# Patient Record
Sex: MALE | Race: WHITE | NOT HISPANIC OR LATINO | Employment: OTHER | URBAN - METROPOLITAN AREA
[De-identification: names, ages, dates, MRNs, and addresses within clinical notes are randomized per-mention and may not be internally consistent; named-entity substitution may affect disease eponyms.]

---

## 2021-05-30 ENCOUNTER — RECORDS - HEALTHEAST (OUTPATIENT)
Dept: ADMINISTRATIVE | Facility: CLINIC | Age: 76
End: 2021-05-30

## 2022-05-28 ENCOUNTER — HOSPITAL ENCOUNTER (EMERGENCY)
Facility: HOSPITAL | Age: 77
Discharge: LEFT BEFORE DISCHARGE | End: 2022-05-28
Attending: EMERGENCY MEDICINE | Admitting: EMERGENCY MEDICINE

## 2022-05-28 ENCOUNTER — APPOINTMENT (OUTPATIENT)
Dept: CT IMAGING | Facility: HOSPITAL | Age: 77
End: 2022-05-28
Attending: EMERGENCY MEDICINE

## 2022-05-28 VITALS
RESPIRATION RATE: 25 BRPM | WEIGHT: 180 LBS | HEART RATE: 79 BPM | SYSTOLIC BLOOD PRESSURE: 152 MMHG | TEMPERATURE: 99.6 F | DIASTOLIC BLOOD PRESSURE: 81 MMHG | OXYGEN SATURATION: 97 %

## 2022-05-28 DIAGNOSIS — R55 SYNCOPE, UNSPECIFIED SYNCOPE TYPE: ICD-10-CM

## 2022-05-28 LAB
ALBUMIN SERPL-MCNC: 3.3 G/DL (ref 3.5–5)
ALBUMIN UR-MCNC: 30 MG/DL
ALP SERPL-CCNC: 59 U/L (ref 45–120)
ALT SERPL W P-5'-P-CCNC: <9 U/L (ref 0–45)
ANION GAP SERPL CALCULATED.3IONS-SCNC: 12 MMOL/L (ref 5–18)
APPEARANCE UR: CLEAR
APTT PPP: 30 SECONDS (ref 22–38)
AST SERPL W P-5'-P-CCNC: 14 U/L (ref 0–40)
BASOPHILS # BLD AUTO: 0.1 10E3/UL (ref 0–0.2)
BASOPHILS NFR BLD AUTO: 1 %
BILIRUB DIRECT SERPL-MCNC: 0.2 MG/DL
BILIRUB SERPL-MCNC: 0.4 MG/DL (ref 0–1)
BILIRUB UR QL STRIP: NEGATIVE
BNP SERPL-MCNC: 60 PG/ML (ref 0–80)
BUN SERPL-MCNC: 19 MG/DL (ref 8–28)
CALCIUM SERPL-MCNC: 8.4 MG/DL (ref 8.5–10.5)
CHLORIDE BLD-SCNC: 103 MMOL/L (ref 98–107)
CO2 SERPL-SCNC: 18 MMOL/L (ref 22–31)
COLOR UR AUTO: ABNORMAL
CREAT SERPL-MCNC: 1.29 MG/DL (ref 0.7–1.3)
D DIMER PPP FEU-MCNC: 0.85 UG/ML FEU (ref 0–0.5)
EOSINOPHIL # BLD AUTO: 0 10E3/UL (ref 0–0.7)
EOSINOPHIL NFR BLD AUTO: 0 %
ERYTHROCYTE [DISTWIDTH] IN BLOOD BY AUTOMATED COUNT: 13.6 % (ref 10–15)
GFR SERPL CREATININE-BSD FRML MDRD: 57 ML/MIN/1.73M2
GLUCOSE BLD-MCNC: 92 MG/DL (ref 70–125)
GLUCOSE UR STRIP-MCNC: NEGATIVE MG/DL
HCT VFR BLD AUTO: 39 % (ref 40–53)
HGB BLD-MCNC: 13 G/DL (ref 13.3–17.7)
HGB UR QL STRIP: ABNORMAL
IMM GRANULOCYTES # BLD: 0 10E3/UL
IMM GRANULOCYTES NFR BLD: 1 %
INR PPP: 1.09 (ref 0.85–1.15)
KETONES UR STRIP-MCNC: NEGATIVE MG/DL
LACTATE SERPL-SCNC: 1.4 MMOL/L (ref 0.7–2)
LEUKOCYTE ESTERASE UR QL STRIP: NEGATIVE
LYMPHOCYTES # BLD AUTO: 0.5 10E3/UL (ref 0.8–5.3)
LYMPHOCYTES NFR BLD AUTO: 9 %
MCH RBC QN AUTO: 30.2 PG (ref 26.5–33)
MCHC RBC AUTO-ENTMCNC: 33.3 G/DL (ref 31.5–36.5)
MCV RBC AUTO: 91 FL (ref 78–100)
MONOCYTES # BLD AUTO: 0.8 10E3/UL (ref 0–1.3)
MONOCYTES NFR BLD AUTO: 14 %
NEUTROPHILS # BLD AUTO: 4.3 10E3/UL (ref 1.6–8.3)
NEUTROPHILS NFR BLD AUTO: 75 %
NITRATE UR QL: NEGATIVE
NRBC # BLD AUTO: 0 10E3/UL
NRBC BLD AUTO-RTO: 0 /100
PH UR STRIP: 5.5 [PH] (ref 5–7)
PLATELET # BLD AUTO: 188 10E3/UL (ref 150–450)
POTASSIUM BLD-SCNC: 4 MMOL/L (ref 3.5–5)
PROT SERPL-MCNC: 6.3 G/DL (ref 6–8)
RBC # BLD AUTO: 4.31 10E6/UL (ref 4.4–5.9)
RBC URINE: 5 /HPF
SODIUM SERPL-SCNC: 133 MMOL/L (ref 136–145)
SP GR UR STRIP: 1.02 (ref 1–1.03)
SQUAMOUS EPITHELIAL: 1 /HPF
TROPONIN I SERPL-MCNC: 0.01 NG/ML (ref 0–0.29)
TROPONIN I SERPL-MCNC: 0.01 NG/ML (ref 0–0.29)
UROBILINOGEN UR STRIP-MCNC: <2 MG/DL
WBC # BLD AUTO: 5.7 10E3/UL (ref 4–11)
WBC URINE: 1 /HPF

## 2022-05-28 PROCEDURE — 250N000011 HC RX IP 250 OP 636: Performed by: EMERGENCY MEDICINE

## 2022-05-28 PROCEDURE — 70450 CT HEAD/BRAIN W/O DYE: CPT

## 2022-05-28 PROCEDURE — 82248 BILIRUBIN DIRECT: CPT | Performed by: EMERGENCY MEDICINE

## 2022-05-28 PROCEDURE — 93005 ELECTROCARDIOGRAM TRACING: CPT | Performed by: EMERGENCY MEDICINE

## 2022-05-28 PROCEDURE — 84484 ASSAY OF TROPONIN QUANT: CPT | Performed by: EMERGENCY MEDICINE

## 2022-05-28 PROCEDURE — 99285 EMERGENCY DEPT VISIT HI MDM: CPT | Mod: 25

## 2022-05-28 PROCEDURE — 36415 COLL VENOUS BLD VENIPUNCTURE: CPT | Performed by: EMERGENCY MEDICINE

## 2022-05-28 PROCEDURE — 81001 URINALYSIS AUTO W/SCOPE: CPT | Performed by: EMERGENCY MEDICINE

## 2022-05-28 PROCEDURE — 83880 ASSAY OF NATRIURETIC PEPTIDE: CPT | Performed by: EMERGENCY MEDICINE

## 2022-05-28 PROCEDURE — 80053 COMPREHEN METABOLIC PANEL: CPT | Performed by: EMERGENCY MEDICINE

## 2022-05-28 PROCEDURE — 72125 CT NECK SPINE W/O DYE: CPT

## 2022-05-28 PROCEDURE — 83605 ASSAY OF LACTIC ACID: CPT | Performed by: EMERGENCY MEDICINE

## 2022-05-28 PROCEDURE — 85025 COMPLETE CBC W/AUTO DIFF WBC: CPT | Performed by: EMERGENCY MEDICINE

## 2022-05-28 PROCEDURE — 85610 PROTHROMBIN TIME: CPT | Performed by: EMERGENCY MEDICINE

## 2022-05-28 PROCEDURE — 85730 THROMBOPLASTIN TIME PARTIAL: CPT | Performed by: EMERGENCY MEDICINE

## 2022-05-28 PROCEDURE — 84484 ASSAY OF TROPONIN QUANT: CPT | Mod: 91 | Performed by: EMERGENCY MEDICINE

## 2022-05-28 PROCEDURE — 85379 FIBRIN DEGRADATION QUANT: CPT | Performed by: EMERGENCY MEDICINE

## 2022-05-28 PROCEDURE — 71275 CT ANGIOGRAPHY CHEST: CPT

## 2022-05-28 RX ORDER — GABAPENTIN 100 MG/1
CAPSULE ORAL SEE ADMIN INSTRUCTIONS
COMMUNITY

## 2022-05-28 RX ORDER — IOPAMIDOL 755 MG/ML
75 INJECTION, SOLUTION INTRAVASCULAR ONCE
Status: COMPLETED | OUTPATIENT
Start: 2022-05-28 | End: 2022-05-28

## 2022-05-28 RX ADMIN — IOPAMIDOL 75 ML: 755 INJECTION, SOLUTION INTRAVENOUS at 17:15

## 2022-05-28 NOTE — DISCHARGE INSTRUCTIONS
Please follow-up with your Primary Care Provider on Tuesday; call to arrange appointment.    Return to the ER immediately for recurrent syncope (if you pass out again or feel that you might), if you develop chest pain, shortness of breath, nausea / vomiting, fever or other concerns.    As discussed, the risks of discharge include but are not limited to death, sudden cardiac arrest, stroke, recurrent syncope with associated injury and persistent vegetative state.

## 2022-05-28 NOTE — ED TRIAGE NOTES
Per EMS, Found on ground outside of grocery store. Had two more syncopal events before they arrived. Patient denies memory of these. Ems reports very weak with standing and walking. Some low systolic bp in the 80s. Patient denies chest pain or dyspnea.      Triage Assessment     Row Name 05/28/22 1542       Triage Assessment (Adult)    Airway WDL WDL       Respiratory WDL    Respiratory WDL WDL       Skin Circulation/Temperature WDL    Skin Circulation/Temperature WDL WDL       Cardiac WDL    Cardiac WDL all       Peripheral/Neurovascular WDL    Peripheral Neurovascular WDL WDL       Cognitive/Neuro/Behavioral WDL    Cognitive/Neuro/Behavioral WDL WDL

## 2022-05-28 NOTE — ED PROVIDER NOTES
Emergency Department Encounter     Evaluation Date & Time:   5/28/2022  3:18 PM    CHIEF COMPLAINT:  Syncope      Triage Note:       Impression and Plan       FINAL IMPRESSION:    ICD-10-CM    1. Syncope, unspecified syncope type  R55          ED COURSE & MEDICAL DECISION MAKING:  3:17PM:I met with the patient to gather history and to perform my initial exam. We discussed plans for the ED course, including diagnostic testing and treatment.     76 year old male, history of CAD s/p 6-vessel CABG, DM2 with neuropathy, HLD, ADRIAN and depression, who presents via EMS for evaluation after he was found down outside of his house. Patient reports that he was just returning home from the grocery store when he felt fatigued and could not keep moving. He denies LOC and lightheadedness. Denies that he hit his head or has HA. Takes aspirin daily; no other anticoagulants. He also denies chest pain, SOB. No recent illness.     EMS report that patient was found on the ground outside his house and patient has no recollection of how he got there. He then proceeded to have 2 additional syncopal episodes after EMS arrival. His initial BP was in the 120s, but he had one episode of hypotension (80s systolic).     On exam, there are no signs of trauma with non-focal neuro exam.     Patient placed on cardiac monitor, IV access established and blood sent for labs.    EKG performed and demonstrated sinus rhythm with first degree AV block with RBBB and no ischemic changes. Troponin WNL (0.01).     PE considered for which d-dimer was checked and mildly elevated (0.85) for which CTA chest was performed and demonstrated:  1.  No evidence of pulmonary embolus or other acute abnormality in the chest.  2.  Multiple pulmonary nodules in the left lung, largest measuring 7 mm. Recommend follow-up in 3-6 months, then at 18-24 months.    No clinical, radiographic or laboratory (BNP 60) evidence of acute CHF.     Head CT also performed and  demonstrated:  1.  No intracranial hemorrhage, mass lesions, hydrocephalus or CT evidence for an acute infarct.  2.  Mild diffuse cerebral parenchymal volume loss. Presumed chronic hypertensive/microvascular ischemic white matter changes.  3.  Mild chronic mucosal thickening of the ethmoid air cells.   4.  Moderate chronic mucosal thickening of the mastoid air cells.    Precautionary cervical spine CT also performed and negative for fracture and post-traumatic subluxation.    Labs otherwise remarkable for no leukocytosis with mild anemia (Hb 13.0). No significant electrolyte derangements or renal impairment with acidosis (bicarb 18). Hepatic panel unremarkable with normal coags.    Given episode of hypotension, lactate checked and WNL (1.4).     Given syncope x 3, admission recommended, however patient declined. He was initially agreeable to a repeat EKG and troponin.    Repeat EKG with sinus rhythm with first degree AV block, RBBB and no ischemic changes. A 2.5 hour delta troponin was drawn, however patient did not want to wait for results. Repeat troponin WNL and unchanged (0.01).    I again recommended admission, however patient declined. He has had full mental capacity throughout ED course and expresses understanding of the risks of discharge, including but not limited to death, sudden cardiac arrest, stroke, recurrent syncope with associated injury and persistent vegetative state.  Despite these risks, the patient still requested discharge.  He was advised to follow-up with his primary care provider on Tuesday.  Return precautions provided.  Patient stable throughout ED course.    Patient discharged AMA.      MEDICATIONS GIVEN IN THE EMERGENCY DEPARTMENT:  Medications   iopamidol (ISOVUE-370) solution 75 mL (75 mLs Intravenous Given 5/28/22 1715)       NEW PRESCRIPTIONS STARTED AT TODAY'S ED VISIT:  Discharge Medication List as of 5/28/2022  6:19 PM          HPI     HPI     Harpreet Tovar is a 76 year old  "male, history of CAD s/p 6-vessel CABG, DM2 with neuropathy, HLD, ADRIAN and depression, who presents to this ED via EMS for evaluation after a fall. Patient had just returned home from the grocery store and was 30 feet away from his door when he \"fell over\". He notes he felt fatigued and \"couldn't keep moving\". He also notes that he think he may have been carrying too much. Patient has a history of neuropathy in his legs so when he falls he states it is especially hard for him to get up. He denies that he lost consciousness and denies lightheadedness. He also denies that he hit his head and has no headache or neck pain. He denies chest pain, shortness of breath. No abdominal pain or back pain. He denies recent illness; no N/V/D, cough or fevers.     Per EMS, patient was found on the ground outside his house. Patient had no recollection of how he got there. He then proceeded to have 2 additional syncopal episodes after EMS arrival. His initial BP was in the 120s, but he had one episode of hypotension (80s systolic).     Patient taken metformin, 81mg aspirin daily, and 100mg of gabapentin.    REVIEW OF SYSTEMS:  All other systems reviewed and are negative.      Medical History     No past medical history on file.    No past surgical history on file.    No family history on file.         gabapentin (NEURONTIN) 100 MG capsule  metFORMIN (GLUCOPHAGE) 500 MG tablet        Physical Exam     First Vitals:  Patient Vitals for the past 24 hrs:   BP Temp Temp src Pulse Resp SpO2 Weight   05/28/22 1815 (!) 152/81 -- -- 79 25 97 % --   05/28/22 1800 134/77 -- -- 76 16 99 % --   05/28/22 1745 (!) 148/85 -- -- 73 25 99 % --   05/28/22 1730 139/78 -- -- 76 17 99 % --   05/28/22 1630 136/74 -- -- 73 26 -- --   05/28/22 1615 137/75 -- -- 72 27 91 % --   05/28/22 1600 130/77 -- -- 70 29 93 % --   05/28/22 1546 -- -- -- -- -- -- 81.6 kg (180 lb)   05/28/22 1545 133/75 -- -- 75 27 92 % --   05/28/22 1542 111/65 99.6  F (37.6  C) Oral 75 " 16 95 % --   05/28/22 1530 111/65 -- -- 75 25 94 % --       PHYSICAL EXAM:   Physical Exam    GENERAL: Awake, alert.  In no acute distress.   HEENT: Normocephalic, atraumatic. Pupils equal, round and reactive. Conjunctiva normal. EOMI without nystagmus.   NECK: No midline tenderness to palpation of cervical spine.  No stridor.  PULMONARY: Chest is atraumatic.  Symmetrical breath sounds without distress.  Lungs clear to auscultation bilaterally without wheezes, rhonchi or rales.  CARDIO: Regular rate and rhythm.  No significant murmur, rub or gallop.  Radial pulses strong and symmetrical.  ABDOMINAL: Abdomen atraumatic, soft, non-distended and non-tender to palpation.  No CVAT, BL.  BACK: Back is atraumatic.  No midline tenderness to palpation of thoracic and lumbar spines.  EXTREMITIES: No lower extremity swelling or edema.      NEURO: GCS 14 for amnesia. Alert and oriented to person, place and time.  Cranial nerves grossly intact. Strength 5/5 BL upper extremities and RLE; strength 4/5 LLE (reports is chronic) with sensation to light touch grossly intact.  PSYCH: Normal mood and affect.  SKIN: No rashes, lacerations.     Results     LAB:  All pertinent labs reviewed and interpreted  Labs Ordered and Resulted from Time of ED Arrival to Time of ED Departure   BASIC METABOLIC PANEL - Abnormal       Result Value    Sodium 133 (*)     Potassium 4.0      Chloride 103      Carbon Dioxide (CO2) 18 (*)     Anion Gap 12      Urea Nitrogen 19      Creatinine 1.29      Calcium 8.4 (*)     Glucose 92      GFR Estimate 57 (*)    D DIMER QUANTITATIVE - Abnormal    D-Dimer Quantitative 0.85 (*)    ROUTINE UA WITH MICROSCOPIC REFLEX TO CULTURE - Abnormal    Color Urine Light Yellow      Appearance Urine Clear      Glucose Urine Negative      Bilirubin Urine Negative      Ketones Urine Negative      Specific Gravity Urine 1.018      Blood Urine 0.03 mg/dL (*)     pH Urine 5.5      Protein Albumin Urine 30  (*)     Urobilinogen Urine  <2.0      Nitrite Urine Negative      Leukocyte Esterase Urine Negative      RBC Urine 5 (*)     WBC Urine 1      Squamous Epithelials Urine 1     HEPATIC FUNCTION PANEL - Abnormal    Bilirubin Total 0.4      Bilirubin Direct 0.2      Protein Total 6.3      Albumin 3.3 (*)     Alkaline Phosphatase 59      AST 14      ALT <9     CBC WITH PLATELETS AND DIFFERENTIAL - Abnormal    WBC Count 5.7      RBC Count 4.31 (*)     Hemoglobin 13.0 (*)     Hematocrit 39.0 (*)     MCV 91      MCH 30.2      MCHC 33.3      RDW 13.6      Platelet Count 188      % Neutrophils 75      % Lymphocytes 9      % Monocytes 14      % Eosinophils 0      % Basophils 1      % Immature Granulocytes 1      NRBCs per 100 WBC 0      Absolute Neutrophils 4.3      Absolute Lymphocytes 0.5 (*)     Absolute Monocytes 0.8      Absolute Eosinophils 0.0      Absolute Basophils 0.1      Absolute Immature Granulocytes 0.0      Absolute NRBCs 0.0     TROPONIN I - Normal    Troponin I 0.01     INR - Normal    INR 1.09     PARTIAL THROMBOPLASTIN TIME - Normal    aPTT 30     B-TYPE NATRIURETIC PEPTIDE ([facility] ONLY) - Normal    BNP 60     LACTIC ACID WHOLE BLOOD - Normal    Lactic Acid 1.4         RADIOLOGY:  CT Chest Pulmonary Embolism w Contrast   Final Result   IMPRESSION:   1.  No evidence of pulmonary embolus or other acute abnormality in the chest.   2.  Multiple pulmonary nodules in the left lung, largest measuring 7 mm. Recommend follow-up described below.      REFERENCE:   Guidelines for Management of Incidental Pulmonary Nodules Detected on CT Images: From the Fleischner Society 2017.    Guidelines apply to incidental nodules in patients who are 35 years or older.   Guidelines do not apply to lung cancer screening, patients with immunosuppression, or patients with known primary cancer.      Nodule size 6 mm or larger   Low-risk patients: Follow-up CT at 3-6 months, then consider CT at 18-24 months.   High-risk patients: Follow-up CT at 3-6 months, then  at 18-24 months if no change.   -Use most suspicious nodule as guide to management.      Consider referral to lung nodule clinic.         Cervical spine CT w/o contrast   Final Result   IMPRESSION:   HEAD CT:   1.  No intracranial hemorrhage, mass lesions, hydrocephalus or CT evidence for an acute infarct.   2.  Mild diffuse cerebral parenchymal volume loss. Presumed chronic hypertensive/microvascular ischemic white matter changes.   3.  Mild chronic mucosal thickening of the ethmoid air cells.    4.  Moderate chronic mucosal thickening of the mastoid air cells.         CERVICAL SPINE CT:   1.  No CT evidence for acute fracture or post traumatic subluxation.      Head CT w/o contrast   Final Result   IMPRESSION:   HEAD CT:   1.  No intracranial hemorrhage, mass lesions, hydrocephalus or CT evidence for an acute infarct.   2.  Mild diffuse cerebral parenchymal volume loss. Presumed chronic hypertensive/microvascular ischemic white matter changes.   3.  Mild chronic mucosal thickening of the ethmoid air cells.    4.  Moderate chronic mucosal thickening of the mastoid air cells.         CERVICAL SPINE CT:   1.  No CT evidence for acute fracture or post traumatic subluxation.          EC2022, 15:27; sinus rhythm with rate of 76 bpm; first degree AV block; RBBB; no ST-T wave changes consistent with ACS or pericarditis; no previous EKG available for comparison    EKG independently reviewed and interpreted by Laurie Salas MD    2022, 17:57; sinus rhythm with rate of 77 bpm; first degree AV block; RBBB; no ST-T wave changes consistent with ACS or pericarditis; no previous EKG available for comparison    EKG independently reviewed and interpreted by Laurie Salas MD          I, Mayda Rivas, am serving as a scribe to document services personally performed by Laurie Salas MD based on my observation and the provider's statements to me. I, Laurie Salas MD attest that Mayda Rivas is acting in a  scribe capacity, has observed my performance of the services and has documented them in accordance with my direction.    Laurie Salas MD  Emergency Medicine  [facility] EMERGENCY DEPARTMENT           Laurie Salas MD  05/29/22 3701

## 2022-05-28 NOTE — ED NOTES
Bed: JNED-07  Expected date: 5/28/22  Expected time:   Means of arrival: Ambulance  Comments:  Seema  76 M  syncope

## 2022-05-29 LAB
ATRIAL RATE - MUSE: 76 BPM
ATRIAL RATE - MUSE: 77 BPM
DIASTOLIC BLOOD PRESSURE - MUSE: 85 MMHG
DIASTOLIC BLOOD PRESSURE - MUSE: NORMAL MMHG
INTERPRETATION ECG - MUSE: NORMAL
INTERPRETATION ECG - MUSE: NORMAL
P AXIS - MUSE: 70 DEGREES
P AXIS - MUSE: 72 DEGREES
PR INTERVAL - MUSE: 262 MS
PR INTERVAL - MUSE: 264 MS
QRS DURATION - MUSE: 160 MS
QRS DURATION - MUSE: 162 MS
QT - MUSE: 402 MS
QT - MUSE: 414 MS
QTC - MUSE: 452 MS
QTC - MUSE: 468 MS
R AXIS - MUSE: 67 DEGREES
R AXIS - MUSE: 73 DEGREES
SYSTOLIC BLOOD PRESSURE - MUSE: 148 MMHG
SYSTOLIC BLOOD PRESSURE - MUSE: NORMAL MMHG
T AXIS - MUSE: 43 DEGREES
T AXIS - MUSE: 63 DEGREES
VENTRICULAR RATE- MUSE: 76 BPM
VENTRICULAR RATE- MUSE: 77 BPM

## 2022-11-10 ENCOUNTER — APPOINTMENT (OUTPATIENT)
Dept: RADIOLOGY | Facility: HOSPITAL | Age: 77
End: 2022-11-10
Attending: EMERGENCY MEDICINE
Payer: COMMERCIAL

## 2022-11-10 ENCOUNTER — APPOINTMENT (OUTPATIENT)
Dept: CARDIOLOGY | Facility: HOSPITAL | Age: 77
End: 2022-11-10
Attending: INTERNAL MEDICINE
Payer: COMMERCIAL

## 2022-11-10 ENCOUNTER — APPOINTMENT (OUTPATIENT)
Dept: ULTRASOUND IMAGING | Facility: HOSPITAL | Age: 77
End: 2022-11-10
Attending: INTERNAL MEDICINE
Payer: COMMERCIAL

## 2022-11-10 ENCOUNTER — APPOINTMENT (OUTPATIENT)
Dept: CT IMAGING | Facility: HOSPITAL | Age: 77
End: 2022-11-10
Attending: INTERNAL MEDICINE
Payer: COMMERCIAL

## 2022-11-10 ENCOUNTER — HOSPITAL ENCOUNTER (OUTPATIENT)
Facility: HOSPITAL | Age: 77
Setting detail: OBSERVATION
Discharge: HOME | End: 2022-11-11
Attending: EMERGENCY MEDICINE | Admitting: INTERNAL MEDICINE
Payer: COMMERCIAL

## 2022-11-10 DIAGNOSIS — R55 SYNCOPE, UNSPECIFIED SYNCOPE TYPE: ICD-10-CM

## 2022-11-10 PROBLEM — L57.0 ACTINIC KERATOSIS: Status: ACTIVE | Noted: 2022-11-10

## 2022-11-10 PROBLEM — G25.89 OTHER EXTRAPYRAMIDAL DISEASE AND ABNORMAL MOVEMENT DISORDERS: Status: ACTIVE | Noted: 2022-11-10

## 2022-11-10 PROBLEM — G89.29 CHRONIC PAIN: Status: ACTIVE | Noted: 2022-11-10

## 2022-11-10 PROBLEM — F33.41 RECURRENT MAJOR DEPRESSIVE DISORDER, IN PARTIAL REMISSION (H): Status: ACTIVE | Noted: 2022-11-10

## 2022-11-10 PROBLEM — G43.109 MIGRAINE WITH AURA: Status: ACTIVE | Noted: 2022-11-10

## 2022-11-10 PROBLEM — E11.9 DIABETES MELLITUS TYPE 2 WITHOUT RETINOPATHY (H): Status: ACTIVE | Noted: 2022-11-10

## 2022-11-10 PROBLEM — N18.30 CKD (CHRONIC KIDNEY DISEASE) STAGE 3, GFR 30-59 ML/MIN (H): Status: ACTIVE | Noted: 2022-11-10

## 2022-11-10 PROBLEM — F32.A DEPRESSIVE DISORDER IN REMISSION: Status: ACTIVE | Noted: 2022-11-10

## 2022-11-10 PROBLEM — Z95.1 HISTORY OF CORONARY ARTERY BYPASS GRAFT: Status: ACTIVE | Noted: 2022-11-10

## 2022-11-10 PROBLEM — E78.5 OTHER AND UNSPECIFIED HYPERLIPIDEMIA: Status: ACTIVE | Noted: 2022-11-10

## 2022-11-10 PROBLEM — G47.30 SLEEP APNEA: Status: ACTIVE | Noted: 2022-11-10

## 2022-11-10 LAB
ANION GAP SERPL CALCULATED.3IONS-SCNC: 12 MMOL/L (ref 7–15)
BASOPHILS # BLD AUTO: 0.1 10E3/UL (ref 0–0.2)
BASOPHILS NFR BLD AUTO: 1 %
BUN SERPL-MCNC: 16.5 MG/DL (ref 8–23)
CALCIUM SERPL-MCNC: 8 MG/DL (ref 8.8–10.2)
CHLORIDE SERPL-SCNC: 107 MMOL/L (ref 98–107)
CREAT SERPL-MCNC: 1.28 MG/DL (ref 0.67–1.17)
DEPRECATED HCO3 PLAS-SCNC: 19 MMOL/L (ref 22–29)
EOSINOPHIL # BLD AUTO: 0.1 10E3/UL (ref 0–0.7)
EOSINOPHIL NFR BLD AUTO: 1 %
ERYTHROCYTE [DISTWIDTH] IN BLOOD BY AUTOMATED COUNT: 14.3 % (ref 10–15)
GFR SERPL CREATININE-BSD FRML MDRD: 57 ML/MIN/1.73M2
GLUCOSE BLDC GLUCOMTR-MCNC: 90 MG/DL (ref 70–99)
GLUCOSE BLDC GLUCOMTR-MCNC: 99 MG/DL (ref 70–99)
GLUCOSE SERPL-MCNC: 107 MG/DL (ref 70–99)
HCT VFR BLD AUTO: 40.4 % (ref 40–53)
HGB BLD-MCNC: 13.2 G/DL (ref 13.3–17.7)
IMM GRANULOCYTES # BLD: 0 10E3/UL
IMM GRANULOCYTES NFR BLD: 1 %
LVEF ECHO: NORMAL
LYMPHOCYTES # BLD AUTO: 1.1 10E3/UL (ref 0.8–5.3)
LYMPHOCYTES NFR BLD AUTO: 16 %
MAGNESIUM SERPL-MCNC: 1.8 MG/DL (ref 1.7–2.3)
MCH RBC QN AUTO: 29.2 PG (ref 26.5–33)
MCHC RBC AUTO-ENTMCNC: 32.7 G/DL (ref 31.5–36.5)
MCV RBC AUTO: 89 FL (ref 78–100)
MONOCYTES # BLD AUTO: 0.6 10E3/UL (ref 0–1.3)
MONOCYTES NFR BLD AUTO: 9 %
NEUTROPHILS # BLD AUTO: 5.2 10E3/UL (ref 1.6–8.3)
NEUTROPHILS NFR BLD AUTO: 72 %
NRBC # BLD AUTO: 0 10E3/UL
NRBC BLD AUTO-RTO: 0 /100
NT-PROBNP SERPL-MCNC: 500 PG/ML (ref 0–1800)
PHOSPHATE SERPL-MCNC: 3 MG/DL (ref 2.5–4.5)
PLATELET # BLD AUTO: 263 10E3/UL (ref 150–450)
POTASSIUM SERPL-SCNC: 3.8 MMOL/L (ref 3.4–5.3)
RBC # BLD AUTO: 4.52 10E6/UL (ref 4.4–5.9)
SARS-COV-2 RNA RESP QL NAA+PROBE: NEGATIVE
SODIUM SERPL-SCNC: 138 MMOL/L (ref 136–145)
TROPONIN T SERPL HS-MCNC: 16 NG/L
TSH SERPL DL<=0.005 MIU/L-ACNC: 3.13 UIU/ML (ref 0.3–4.2)
WBC # BLD AUTO: 7 10E3/UL (ref 4–11)

## 2022-11-10 PROCEDURE — 85025 COMPLETE CBC W/AUTO DIFF WBC: CPT | Performed by: EMERGENCY MEDICINE

## 2022-11-10 PROCEDURE — 93005 ELECTROCARDIOGRAM TRACING: CPT | Performed by: STUDENT IN AN ORGANIZED HEALTH CARE EDUCATION/TRAINING PROGRAM

## 2022-11-10 PROCEDURE — 93880 EXTRACRANIAL BILAT STUDY: CPT

## 2022-11-10 PROCEDURE — 80048 BASIC METABOLIC PNL TOTAL CA: CPT | Performed by: EMERGENCY MEDICINE

## 2022-11-10 PROCEDURE — C9803 HOPD COVID-19 SPEC COLLECT: HCPCS

## 2022-11-10 PROCEDURE — 99220 PR INITIAL OBSERVATION CARE,LEVEL III: CPT | Performed by: INTERNAL MEDICINE

## 2022-11-10 PROCEDURE — 84100 ASSAY OF PHOSPHORUS: CPT | Performed by: INTERNAL MEDICINE

## 2022-11-10 PROCEDURE — G0378 HOSPITAL OBSERVATION PER HR: HCPCS

## 2022-11-10 PROCEDURE — 84146 ASSAY OF PROLACTIN: CPT | Performed by: INTERNAL MEDICINE

## 2022-11-10 PROCEDURE — 999N000208 ECHOCARDIOGRAM COMPLETE

## 2022-11-10 PROCEDURE — 82962 GLUCOSE BLOOD TEST: CPT | Mod: 91

## 2022-11-10 PROCEDURE — 71045 X-RAY EXAM CHEST 1 VIEW: CPT

## 2022-11-10 PROCEDURE — 84443 ASSAY THYROID STIM HORMONE: CPT | Performed by: INTERNAL MEDICINE

## 2022-11-10 PROCEDURE — 70450 CT HEAD/BRAIN W/O DYE: CPT

## 2022-11-10 PROCEDURE — 83880 ASSAY OF NATRIURETIC PEPTIDE: CPT | Performed by: EMERGENCY MEDICINE

## 2022-11-10 PROCEDURE — 255N000002 HC RX 255 OP 636: Performed by: INTERNAL MEDICINE

## 2022-11-10 PROCEDURE — 83735 ASSAY OF MAGNESIUM: CPT | Performed by: INTERNAL MEDICINE

## 2022-11-10 PROCEDURE — 99285 EMERGENCY DEPT VISIT HI MDM: CPT | Mod: 25

## 2022-11-10 PROCEDURE — 250N000013 HC RX MED GY IP 250 OP 250 PS 637: Performed by: INTERNAL MEDICINE

## 2022-11-10 PROCEDURE — 84484 ASSAY OF TROPONIN QUANT: CPT | Performed by: EMERGENCY MEDICINE

## 2022-11-10 PROCEDURE — U0003 INFECTIOUS AGENT DETECTION BY NUCLEIC ACID (DNA OR RNA); SEVERE ACUTE RESPIRATORY SYNDROME CORONAVIRUS 2 (SARS-COV-2) (CORONAVIRUS DISEASE [COVID-19]), AMPLIFIED PROBE TECHNIQUE, MAKING USE OF HIGH THROUGHPUT TECHNOLOGIES AS DESCRIBED BY CMS-2020-01-R: HCPCS | Performed by: EMERGENCY MEDICINE

## 2022-11-10 PROCEDURE — 36415 COLL VENOUS BLD VENIPUNCTURE: CPT | Performed by: EMERGENCY MEDICINE

## 2022-11-10 PROCEDURE — 93306 TTE W/DOPPLER COMPLETE: CPT | Mod: 26 | Performed by: INTERNAL MEDICINE

## 2022-11-10 RX ORDER — FLUTICASONE PROPIONATE 50 MCG
1 SPRAY, SUSPENSION (ML) NASAL DAILY PRN
COMMUNITY

## 2022-11-10 RX ORDER — ALBUTEROL SULFATE 90 UG/1
2 AEROSOL, METERED RESPIRATORY (INHALATION) EVERY 4 HOURS PRN
COMMUNITY

## 2022-11-10 RX ORDER — FLUTICASONE PROPIONATE 50 MCG
1 SPRAY, SUSPENSION (ML) NASAL DAILY
Status: DISCONTINUED | OUTPATIENT
Start: 2022-11-10 | End: 2022-11-11 | Stop reason: HOSPADM

## 2022-11-10 RX ORDER — ACETAMINOPHEN 325 MG/1
650 TABLET ORAL EVERY 6 HOURS PRN
Status: DISCONTINUED | OUTPATIENT
Start: 2022-11-10 | End: 2022-11-11 | Stop reason: HOSPADM

## 2022-11-10 RX ORDER — AMOXICILLIN 250 MG
2 CAPSULE ORAL 2 TIMES DAILY PRN
Status: DISCONTINUED | OUTPATIENT
Start: 2022-11-10 | End: 2022-11-11 | Stop reason: HOSPADM

## 2022-11-10 RX ORDER — GABAPENTIN 100 MG/1
100 CAPSULE ORAL 2 TIMES DAILY
Status: DISCONTINUED | OUTPATIENT
Start: 2022-11-10 | End: 2022-11-11 | Stop reason: HOSPADM

## 2022-11-10 RX ORDER — ACETAMINOPHEN 650 MG/1
650 SUPPOSITORY RECTAL EVERY 6 HOURS PRN
Status: DISCONTINUED | OUTPATIENT
Start: 2022-11-10 | End: 2022-11-11 | Stop reason: HOSPADM

## 2022-11-10 RX ORDER — AMOXICILLIN 250 MG
1 CAPSULE ORAL 2 TIMES DAILY PRN
Status: DISCONTINUED | OUTPATIENT
Start: 2022-11-10 | End: 2022-11-11 | Stop reason: HOSPADM

## 2022-11-10 RX ORDER — ONDANSETRON 2 MG/ML
4 INJECTION INTRAMUSCULAR; INTRAVENOUS EVERY 6 HOURS PRN
Status: DISCONTINUED | OUTPATIENT
Start: 2022-11-10 | End: 2022-11-11 | Stop reason: HOSPADM

## 2022-11-10 RX ORDER — ALBUTEROL SULFATE 90 UG/1
2 AEROSOL, METERED RESPIRATORY (INHALATION) EVERY 4 HOURS PRN
Status: DISCONTINUED | OUTPATIENT
Start: 2022-11-10 | End: 2022-11-11 | Stop reason: HOSPADM

## 2022-11-10 RX ORDER — ASPIRIN 325 MG
325 TABLET ORAL EVERY 6 HOURS PRN
Status: ON HOLD | COMMUNITY
End: 2024-01-01

## 2022-11-10 RX ORDER — ONDANSETRON 4 MG/1
4 TABLET, ORALLY DISINTEGRATING ORAL EVERY 6 HOURS PRN
Status: DISCONTINUED | OUTPATIENT
Start: 2022-11-10 | End: 2022-11-11 | Stop reason: HOSPADM

## 2022-11-10 RX ORDER — CALCIUM CARBONATE 500 MG/1
1000 TABLET, CHEWABLE ORAL 4 TIMES DAILY PRN
Status: DISCONTINUED | OUTPATIENT
Start: 2022-11-10 | End: 2022-11-11 | Stop reason: HOSPADM

## 2022-11-10 RX ORDER — DEXTROSE MONOHYDRATE 25 G/50ML
25-50 INJECTION, SOLUTION INTRAVENOUS
Status: DISCONTINUED | OUTPATIENT
Start: 2022-11-10 | End: 2022-11-11 | Stop reason: HOSPADM

## 2022-11-10 RX ORDER — LANOLIN ALCOHOL/MO/W.PET/CERES
3 CREAM (GRAM) TOPICAL
Status: DISCONTINUED | OUTPATIENT
Start: 2022-11-10 | End: 2022-11-11 | Stop reason: HOSPADM

## 2022-11-10 RX ORDER — NICOTINE POLACRILEX 4 MG
15-30 LOZENGE BUCCAL
Status: DISCONTINUED | OUTPATIENT
Start: 2022-11-10 | End: 2022-11-11 | Stop reason: HOSPADM

## 2022-11-10 RX ADMIN — GABAPENTIN 100 MG: 100 CAPSULE ORAL at 21:44

## 2022-11-10 RX ADMIN — PERFLUTREN 3 ML: 6.52 INJECTION, SUSPENSION INTRAVENOUS at 14:49

## 2022-11-10 ASSESSMENT — ACTIVITIES OF DAILY LIVING (ADL)
ADLS_ACUITY_SCORE: 35
ADLS_ACUITY_SCORE: 31
ADLS_ACUITY_SCORE: 35
ADLS_ACUITY_SCORE: 35
DEPENDENT_IADLS:: INDEPENDENT

## 2022-11-10 NOTE — H&P
[facility] Internal Medicine Admission History and Physical    Harpreet Tovar  [id number]  [address]  [place] [facility] [address]  : 1945  Admission Date/Time: 11/10/2022 11:38 AM    Primary Care Provider / Referring Physician: [facility] Attending Physician:  Joshua Hernandez DO     Assessment:     Principal Problem:    Syncope  Active Problems:    Actinic keratosis    Chronic pain    Diabetes mellitus type 2 without retinopathy (H)    Recurrent major depressive disorder, in partial remission (H)    CKD (chronic kidney disease) stage 3, GFR 30-59 ml/min (H)    History of coronary artery bypass graft      Plan:    77 year old male with history of CAD with remote history of CABG, CKD 3, mood disorder, DM2 and hypertension presents with recurrent syncope and collapse.      Syncope and collapse: Patient with 3-4 episodes of syncope with collapse prior to admission  Initial labs and hemodynamics unremarkable  EKG not concerning for ACS or arrhythmia  Troponin and BNP normal  TSH normal  COVID-negative  Echocardiogram on admission is normal  -- Check head CT and carotid ultrasound  -- Check orthostatics and provide IV fluids if needed  -- Check prolactin level and if elevated will obtain EEG and consult neurology  --Monitor overnight on telemetry  -- PT/OT      CKD 3: GFR stable      CAD: Remote history of CABG over 20 years ago  TTE on admission is normal  --Patient is not on scheduled aspirin or statin  -- Outpatient cardiology follow-up      DM2: Hold home metformin while inpatient and placed on sliding scale insulin for now, escalate insulin needs as needed      History of reactive airway disease: Continue home albuterol inhaler      Peripheral neuropathy: Continue home gabapentin      DVT PPX: SCD    Code status: DNR confirmed on admission      Needs for Discharge: testing/consultation and mobility progress     ESTIMATED DISCHARGE:  1D      Joshua Hernandez  D.O.          _____________________________________________________________  CHIEF COMPLAINT:    Syncope and collapse    HPI:  77 year old male with history of CAD with remote history of CABG, CKD 3, mood disorder, DM2 and hypertension presents with recurrent syncope and collapse.  Patient with 3-4 episodes of syncope with collapse prior to admission.  Patient was exerting himself carrying groceries up a hill when he became lightheaded and woke up on the ground.  He then tried to stand up and had recurrent syncopal episode at least twice, maybe 3 additional times.  Currently the patient has no acute complaints.  Remainder of ROS negative. Denies any other exacerbating or improving factors.        ALLERGIES/SENSITIVITIES:   Allergies   Allergen Reactions     Tylenol [Acetaminophen] Anxiety and Dizziness     It happened a couple year of ago       (Not in a hospital admission)      Past Medical History:   Diagnosis Date     CAD (coronary artery disease)      CKD (chronic kidney disease) stage 3, GFR 30-59 ml/min (H) 11/10/2022     Diabetes mellitus type 2 without retinopathy (H) 11/10/2022     History of coronary artery bypass graft 11/10/2022     Recurrent major depressive disorder, in partial remission (H) 11/10/2022       Past Surgical History:   Procedure Laterality Date     history of CABG         REVIEW OF SYSTEMS:   Constitutional: no fever, chills, or sweats  Eyes: No visual disturbance or irritation  ENT: No nose or throat congestion or pain  Respiratory: No wheezes, cough, shortness of breath, or pain with breathing  Cardiovascular: No chest pain or palpitations  Gastrointestinal: No nausea, vomiting, diarrhea, dyspepsia, or pain  Genitourinary: No urgency, frequency, or dysuria  Integument/breast: No rash, pruritis, or lesion  Hematologic/lymphatic: No bleeding or unusual bruising  Musculoskeletal: No joint swelling, pain, or unusual back pain  Neurological: No headache, arm or leg numbness or weakness,, or  "gait disturbance  Psychiatric: No anxiety, or depression, or hallucinations  Endocrine: No unusual fatigue, appetite disturbance, sleep disturbance, or unusual weight loss or gain  Allergic/Immunologic: No hives, allergic swelling or wheeze or rhinitis  All other systems on reveiw are negative.    Social History     Socioeconomic History     Marital status:      Spouse name: Not on file     Number of children: Not on file     Years of education: Not on file     Highest education level: Not on file   Occupational History     Not on file   Tobacco Use     Smoking status: Not on file     Smokeless tobacco: Not on file   Substance and Sexual Activity     Alcohol use: Not on file     Drug use: Not on file     Sexual activity: Not on file   Other Topics Concern     Not on file   Social History Narrative     Not on file     Social Determinants of Health     Financial Resource Strain: Not on file   Food Insecurity: Not on file   Transportation Needs: Not on file   Physical Activity: Not on file   Stress: Not on file   Social Connections: Not on file   Intimate Partner Violence: Not on file   Housing Stability: Not on file          Family History   Problem Relation Age of Onset     Dementia Mother      Pancreatitis Father        PHYSICAL EXAM:  General Appearance: In no acute distress  BP (!) 160/81   Pulse 69   Temp 98.3  F (36.8  C) (Oral)   Resp 30   Ht 1.702 m (5' 7\")   Wt 81.6 kg (180 lb)   SpO2 100%   BMI 28.19 kg/m    EYES: Clear, without inflammation   HEENT: Without congestion or inflammation  RESPIRATORY: Clear to auscultation, no wheezes or rales  CARDIOVASCULAR: S1, S2, without murmur, rub, or gallop. No le edema bilat.  ABDOMEN: soft and non-tender  RECTAL: deferred  GENITOURINARY: deferred  NEUROLOGIC: No focal arm or leg  weakness, speech is clear  PSYCHIATRIC: Oriented X 3, without confusion, behavior and affect normal    Labs Reviewed:   Recent Results (from the past 24 hour(s))   Basic " metabolic panel    Collection Time: 11/10/22 12:01 PM   Result Value Ref Range    Sodium 138 136 - 145 mmol/L    Potassium 3.8 3.4 - 5.3 mmol/L    Chloride 107 98 - 107 mmol/L    Carbon Dioxide (CO2) 19 (L) 22 - 29 mmol/L    Anion Gap 12 7 - 15 mmol/L    Urea Nitrogen 16.5 8.0 - 23.0 mg/dL    Creatinine 1.28 (H) 0.67 - 1.17 mg/dL    Calcium 8.0 (L) 8.8 - 10.2 mg/dL    Glucose 107 (H) 70 - 99 mg/dL    GFR Estimate 57 (L) >60 mL/min/1.73m2   Troponin T, High Sensitivity (now)    Collection Time: 11/10/22 12:01 PM   Result Value Ref Range    Troponin T, High Sensitivity 16 <=22 ng/L   Nt probnp inpatient    Collection Time: 11/10/22 12:01 PM   Result Value Ref Range    N terminal Pro BNP Inpatient 500 0 - 1,800 pg/mL   CBC with platelets and differential    Collection Time: 11/10/22 12:01 PM   Result Value Ref Range    WBC Count 7.0 4.0 - 11.0 10e3/uL    RBC Count 4.52 4.40 - 5.90 10e6/uL    Hemoglobin 13.2 (L) 13.3 - 17.7 g/dL    Hematocrit 40.4 40.0 - 53.0 %    MCV 89 78 - 100 fL    MCH 29.2 26.5 - 33.0 pg    MCHC 32.7 31.5 - 36.5 g/dL    RDW 14.3 10.0 - 15.0 %    Platelet Count 263 150 - 450 10e3/uL    % Neutrophils 72 %    % Lymphocytes 16 %    % Monocytes 9 %    % Eosinophils 1 %    % Basophils 1 %    % Immature Granulocytes 1 %    NRBCs per 100 WBC 0 <1 /100    Absolute Neutrophils 5.2 1.6 - 8.3 10e3/uL    Absolute Lymphocytes 1.1 0.8 - 5.3 10e3/uL    Absolute Monocytes 0.6 0.0 - 1.3 10e3/uL    Absolute Eosinophils 0.1 0.0 - 0.7 10e3/uL    Absolute Basophils 0.1 0.0 - 0.2 10e3/uL    Absolute Immature Granulocytes 0.0 <=0.4 10e3/uL    Absolute NRBCs 0.0 10e3/uL   Magnesium    Collection Time: 11/10/22 12:01 PM   Result Value Ref Range    Magnesium 1.8 1.7 - 2.3 mg/dL   TSH with free T4 reflex    Collection Time: 11/10/22 12:01 PM   Result Value Ref Range    TSH 3.13 0.30 - 4.20 uIU/mL   Phosphorus    Collection Time: 11/10/22 12:01 PM   Result Value Ref Range    Phosphorus 3.0 2.5 - 4.5 mg/dL   Asymptomatic  COVID-19 Virus (Coronavirus) by PCR Nasopharyngeal    Collection Time: 11/10/22  1:46 PM    Specimen: Nasopharyngeal; Swab   Result Value Ref Range    SARS CoV2 PCR Negative Negative   Echocardiogram Complete    Collection Time: 11/10/22  2:31 PM   Result Value Ref Range    LVEF  55-60%

## 2022-11-10 NOTE — ED PROVIDER NOTES
EMERGENCY DEPARTMENT ENCOUNTER      NAME: Harpreet Tovar  AGE: 77 year old male  YOB: 1945  MRN: [id number]  EVALUATION DATE & TIME: 11/10/2022 11:38 AM    PCP: [facility]    ED PROVIDER: Juan Love D.O.      Chief Complaint   Patient presents with     Syncope     Dizziness       FINAL IMPRESSION:  1. Syncope, unspecified syncope type        ED COURSE & MEDICAL DECISION MAKIN:48 AM I met with the patient to gather history and to perform my initial exam. I discussed the plan for care while in the Emergency Department.  1:44 PM I spoke with hospitalist Dr. Joshua Hernandez regarding patient admission.   1:45 PM I rechecked the patient and updated them on laboratory, imaging results and admission. Patient is reluctantly okay with being admitted.        Pertinent Labs & Imaging studies reviewed. (See chart for details)  77 year old male presents to the Emergency Department for evaluation of multiple unprovoked syncopal episodes no chest pain or shortness of breath.  He is asymptomatic in the emergency department.  Do not believe this represents PE.  There is no evidence of infection.  EKG shows a left bundle branch block, but otherwise was unremarkable.  Bundle branch block does not appear new.  Lab testing has been unremarkable for obvious etiology.  At this time do believe that cardiac etiology is likely, and will admit for further management to the hospitalist service.        At the conclusion of the encounter I discussed the results of all of the tests and the disposition. The questions were answered. The patient or family acknowledged understanding and was agreeable with the care plan.    HPI    Patient information was obtained from: Patient     Use of : N/A     Harpreet Tovar is a 77 year old male with a history of coronary artery disease and diabetes mellitus type 2, who presents to the ED via EMS for the evaluation of syncope.    The patient noted  that he had 4-5 syncope episodes today at the [facility]. His recent one was at the bus stop when EMS found him and brought him here for further evaluation. He notes that he didn't feel the syncope coming and said it was sudden. He did note feeling weak walking up the hill on the way to the bus stop. He denies any head trauma from his syncope episode. Patient notes that he also had an episode of syncope last May when he was carrying a heavy backpack in his driveway. He notes having a history of a heart attack and had 6 bypasses surgeries with grafts.     Patient denies any chest pain, shortness of breath, nausea, vomiting, cough, congestion or fever.  Patient does smoke, but does not drink alcohol. He denies any medication allergies. He notes having other previous surgeries in the past such as having both sides of his 1st ribs removed, as well as a vasectomy.       REVIEW OF SYSTEMS  Constitutional:  Denies fever, chills, weight loss. Endorses syncope and weakness.   Eyes:  No pain, discharge, redness  HENT:  Denies sore throat, ear pain, congestion  Respiratory: No SOB, wheeze or cough  Cardiovascular:  No CP, palpitations  GI:  Denies abdominal pain, nausea, vomiting, diarrhea  : Denies dysuria, hematuria  Musculoskeletal:  Denies any new muscle/joint pain, swelling or loss of function.  Skin:  Denies rash, pallor  Neurologic:  Denies headache, focal weakness or sensory changes  Lymph: Denies swollen nodes    All other systems negative unless noted in HPI.    PAST MEDICAL HISTORY:  History reviewed. No pertinent past medical history.    PAST SURGICAL HISTORY:  History reviewed. No pertinent surgical history.      CURRENT MEDICATIONS:    No current facility-administered medications for this encounter.     Current Outpatient Medications   Medication     gabapentin (NEURONTIN) 100 MG capsule     metFORMIN (GLUCOPHAGE) 500 MG tablet         ALLERGIES:  No Known Allergies    FAMILY HISTORY:  No family history on  "file.    SOCIAL HISTORY:  Social History     Socioeconomic History     Marital status:        VITALS:  Patient Vitals for the past 24 hrs:   BP Temp Temp src Pulse Resp SpO2 Height Weight   11/10/22 1145 102/66 -- -- 89 -- 97 % -- --   11/10/22 1144 102/66 98.3  F (36.8  C) Oral 86 16 94 % 1.702 m (5' 7\") 81.6 kg (180 lb)       PHYSICAL EXAM    VITAL SIGNS: /66   Pulse 89   Temp 98.3  F (36.8  C) (Oral)   Resp 16   Ht 1.702 m (5' 7\")   Wt 81.6 kg (180 lb)   SpO2 97%   BMI 28.19 kg/m      General Appearance: Well-appearing, well-nourished, no acute distress   Head:  Normocephalic, without obvious abnormality, atraumatic  Eyes:  PERRL, conjunctiva/corneas clear, EOM's intact,  ENT:  Lips, mucosa, and tongue normal, membranes are moist without pallor  Neck:  Normal ROM, symmetrical, trachea midline    Cardio:  Regular rate and rhythm, no murmur, rub or gallop, 2+ pulses symmetric in all extremities  Pulm:  Clear to auscultation bilaterally, respirations unlabored,  Abdomen:  Soft, non-tender, no rebound or guarding.  Musculoskeletal: Full ROM, no edema, no cyanosis, good ROM of major joints  Integument:  Warm, Dry, No erythema, No rash. Cervical scar across sternum.    Neurologic:  Alert & oriented.  No focal deficits appreciated.  Ambulatory.  Psychiatric:  Affect normal, Judgment normal, Mood normal.      LABS  Results for orders placed or performed during the hospital encounter of 11/10/22 (from the past 24 hour(s))   CBC with platelets + differential    Narrative    The following orders were created for panel order CBC with platelets + differential.  Procedure                               Abnormality         Status                     ---------                               -----------         ------                     CBC with platelets and d...[[id number]]  Abnormal            Final result                 Please view results for these tests on the individual orders.   Basic metabolic panel "   Result Value Ref Range    Sodium 138 136 - 145 mmol/L    Potassium 3.8 3.4 - 5.3 mmol/L    Chloride 107 98 - 107 mmol/L    Carbon Dioxide (CO2) 19 (L) 22 - 29 mmol/L    Anion Gap 12 7 - 15 mmol/L    Urea Nitrogen 16.5 8.0 - 23.0 mg/dL    Creatinine 1.28 (H) 0.67 - 1.17 mg/dL    Calcium 8.0 (L) 8.8 - 10.2 mg/dL    Glucose 107 (H) 70 - 99 mg/dL    GFR Estimate 57 (L) >60 mL/min/1.73m2   Troponin T, High Sensitivity (now)   Result Value Ref Range    Troponin T, High Sensitivity 16 <=22 ng/L   Nt probnp inpatient   Result Value Ref Range    N terminal Pro BNP Inpatient 500 0 - 1,800 pg/mL   CBC with platelets and differential   Result Value Ref Range    WBC Count 7.0 4.0 - 11.0 10e3/uL    RBC Count 4.52 4.40 - 5.90 10e6/uL    Hemoglobin 13.2 (L) 13.3 - 17.7 g/dL    Hematocrit 40.4 40.0 - 53.0 %    MCV 89 78 - 100 fL    MCH 29.2 26.5 - 33.0 pg    MCHC 32.7 31.5 - 36.5 g/dL    RDW 14.3 10.0 - 15.0 %    Platelet Count 263 150 - 450 10e3/uL    % Neutrophils 72 %    % Lymphocytes 16 %    % Monocytes 9 %    % Eosinophils 1 %    % Basophils 1 %    % Immature Granulocytes 1 %    NRBCs per 100 WBC 0 <1 /100    Absolute Neutrophils 5.2 1.6 - 8.3 10e3/uL    Absolute Lymphocytes 1.1 0.8 - 5.3 10e3/uL    Absolute Monocytes 0.6 0.0 - 1.3 10e3/uL    Absolute Eosinophils 0.1 0.0 - 0.7 10e3/uL    Absolute Basophils 0.1 0.0 - 0.2 10e3/uL    Absolute Immature Granulocytes 0.0 <=0.4 10e3/uL    Absolute NRBCs 0.0 10e3/uL   XR Chest Port 1 View    Narrative    EXAM: XR CHEST PORT 1 VIEW  LOCATION: [facility]  DATE/TIME: 11/10/2022 12:11 PM    INDICATION: Syncope  COMPARISON: CT chest 05/28/2022      Impression    IMPRESSION: Post open-heart surgery. Old healed upper left rib fractures. No definite pneumothorax. Right upper lobe calcified granuloma. Mild basilar scarring. The lungs are otherwise clear. No focal pneumonic consolidation or pleural effusion. Normal   heart size. No vascular congestion or overt  pulmonary edema.         RADIOLOGY  XR Chest Port 1 View   Final Result   IMPRESSION: Post open-heart surgery. Old healed upper left rib fractures. No definite pneumothorax. Right upper lobe calcified granuloma. Mild basilar scarring. The lungs are otherwise clear. No focal pneumonic consolidation or pleural effusion. Normal    heart size. No vascular congestion or overt pulmonary edema.      US Carotid Bilateral    (Results Pending)   Echocardiogram Complete    (Results Pending)          EKG:    Rate: 86bpm  Rhythm: Normal Sinus Rhythm  Axis: Normal  Interval: Normal  Conduction: RBBB  QRS: Wide  ST: Normal  T-wave: Normal  QT: Not prolonged  Comparison EKG: no significant change compared to 28 May 2022  Impression:  No acute ischemic change   I have independently reviewed and interpreted today's EKG, pending Cardiologist read        MEDICATIONS GIVEN IN THE EMERGENCY:  Medications - No data to display    NEW PRESCRIPTIONS STARTED AT TODAY'S ER VISIT  New Prescriptions    No medications on file      I, Mohsen Olmos, am serving as a scribe to document services personally performed by Juan Love D.O. based on my observation and the provider's statements to me. I, Juan Love D.O. attest that Mohsen Olmos is acting in a scribe capacity, has observed my performance of the services and has documented them in accordance with my direction.    Juan Love D.O.  Emergency Medicine  [facility] EMERGENCY DEPARTMENT  [address] [place] [id number]  [phone]  Dept: [phone]     Juan Love DO  11/10/22 1500

## 2022-11-10 NOTE — ED TRIAGE NOTES
"Arrived by EMS from a bus stop.  Pt was at the bus stop when he had a syncopal episode.  When Medics arrived BP  was systolic 85 and had another passing out x 2.  Pt is awake now and describing his feeling as \" strange\"  Denies CP.  Pt has diaphoresis on arrival.  BS 124mg/dl     Triage Assessment     Row Name 11/10/22 1141       Triage Assessment (Adult)    Airway WDL WDL       Respiratory WDL    Respiratory WDL WDL       Skin Circulation/Temperature WDL    Skin Circulation/Temperature WDL WDL       Cardiac WDL    Cardiac WDL WDL       Peripheral/Neurovascular WDL    Peripheral Neurovascular WDL WDL       Cognitive/Neuro/Behavioral WDL    Cognitive/Neuro/Behavioral WDL WDL              "

## 2022-11-10 NOTE — PHARMACY-ADMISSION MEDICATION HISTORY
Pharmacy Note - Admission Medication History    Patient was not able to provide strength for metformin and no dispense report. Received dose info from outside resource.   ______________________________________________________________________    Prior To Admission (PTA) med list completed and updated in EMR.       PTA Med List   Medication Sig Last Dose     albuterol (PROAIR HFA/PROVENTIL HFA/VENTOLIN HFA) 108 (90 Base) MCG/ACT inhaler Inhale 2 puffs into the lungs every 4 hours as needed 11/10/2022 at am     aspirin (ASA) 325 MG tablet Take 325 mg by mouth every 6 hours as needed More than a month     fluticasone (FLONASE) 50 MCG/ACT nasal spray Spray 1 spray into both nostrils daily 11/9/2022     gabapentin (NEURONTIN) 100 MG capsule Take 100 mg by mouth 2 times daily 11/10/2022 at am     metFORMIN (GLUCOPHAGE) 500 MG tablet Take 1,000 mg by mouth daily 11/10/2022 at am       Information source(s): Patient  Method of interview communication: in-person    Summary of Changes to PTA Med List  New: albuterol, asprin, flonase  Discontinued: None  Changed: gabapentin every day--> bid, metformin 1 tab bid--> 2 tabs qd    Patient was asked about OTC/herbal products specifically.  PTA med list reflects this.    In the past week, patient estimated taking medication this percent of the time:  greater than 90%.    Allergies were reviewed, assessed, and updated with the patient.      Patient did not bring any medications to the hospital and can't retrieve from home. No multi-dose medications are available for use during hospital stay.     The information provided in this note is only as accurate as the sources available at the time of the update(s).    Thank you for the opportunity to participate in the care of this patient.    Tianna merchant [facility]  11/10/2022 2:25 PM

## 2022-11-11 ENCOUNTER — APPOINTMENT (OUTPATIENT)
Dept: PHYSICAL THERAPY | Facility: HOSPITAL | Age: 77
End: 2022-11-11
Attending: INTERNAL MEDICINE
Payer: COMMERCIAL

## 2022-11-11 VITALS
OXYGEN SATURATION: 97 % | RESPIRATION RATE: 20 BRPM | WEIGHT: 180.5 LBS | HEART RATE: 58 BPM | DIASTOLIC BLOOD PRESSURE: 99 MMHG | SYSTOLIC BLOOD PRESSURE: 175 MMHG | TEMPERATURE: 98.5 F | HEIGHT: 67 IN | BODY MASS INDEX: 28.33 KG/M2

## 2022-11-11 LAB
ATRIAL RATE - MUSE: 86 BPM
DIASTOLIC BLOOD PRESSURE - MUSE: 66 MMHG
GLUCOSE BLDC GLUCOMTR-MCNC: 98 MG/DL (ref 70–99)
HGB BLD-MCNC: 14.5 G/DL (ref 13.3–17.7)
HOLD SPECIMEN: NORMAL
INTERPRETATION ECG - MUSE: NORMAL
P AXIS - MUSE: 57 DEGREES
PR INTERVAL - MUSE: 254 MS
PROLACTIN SERPL 3RD IS-MCNC: 23 NG/ML (ref 4–15)
QRS DURATION - MUSE: 156 MS
QT - MUSE: 404 MS
QTC - MUSE: 483 MS
R AXIS - MUSE: 73 DEGREES
SYSTOLIC BLOOD PRESSURE - MUSE: 102 MMHG
T AXIS - MUSE: 38 DEGREES
VENTRICULAR RATE- MUSE: 86 BPM

## 2022-11-11 PROCEDURE — 250N000013 HC RX MED GY IP 250 OP 250 PS 637: Performed by: INTERNAL MEDICINE

## 2022-11-11 PROCEDURE — 36415 COLL VENOUS BLD VENIPUNCTURE: CPT | Performed by: INTERNAL MEDICINE

## 2022-11-11 PROCEDURE — 97161 PT EVAL LOW COMPLEX 20 MIN: CPT | Mod: GP

## 2022-11-11 PROCEDURE — 82962 GLUCOSE BLOOD TEST: CPT

## 2022-11-11 PROCEDURE — 99217 PR OBSERVATION CARE DISCHARGE: CPT | Performed by: INTERNAL MEDICINE

## 2022-11-11 PROCEDURE — G0378 HOSPITAL OBSERVATION PER HR: HCPCS

## 2022-11-11 PROCEDURE — 97116 GAIT TRAINING THERAPY: CPT | Mod: GP

## 2022-11-11 PROCEDURE — 85018 HEMOGLOBIN: CPT | Performed by: INTERNAL MEDICINE

## 2022-11-11 RX ADMIN — GABAPENTIN 100 MG: 100 CAPSULE ORAL at 09:24

## 2022-11-11 ASSESSMENT — ACTIVITIES OF DAILY LIVING (ADL)
ADLS_ACUITY_SCORE: 31

## 2022-11-11 NOTE — DISCHARGE SUMMARY
[facility] MEDICINE  DISCHARGE SUMMARY      Primary Care Physician: [facility]              Admission Date: 11/10/2022    Discharge Provider: Joshua Hernandez DO Discharge Date: 11/11/2022    Diet: see discharge orders below Code Status: DNR    Activity: as tolerated       Condition at Discharge: Stable      REASON FOR ADMISSION (See Admission Note for Details)      Syncope    PRINCIPAL DISCHARGE DIAGNOSIS    Principal Problem:    Syncope  Active Problems:    Actinic keratosis    Chronic pain    Diabetes mellitus type 2 without retinopathy (H)    Recurrent major depressive disorder, in partial remission (H)    CKD (chronic kidney disease) stage 3, GFR 30-59 ml/min (H)    History of coronary artery bypass graft        SIGNIFICANT FINDINGS (Imaging, labs):      See below    PENDING LABS      None    PROCEDURES ( this hospitalization only)       None    RECOMMENDATION FOR F/U VISIT      See below    DISPOSITION ( home, home care, TCU...)      Home    SUMMARY OF HOSPITAL COURSE:       77 year old male with history of CAD with remote history of CABG, CKD 3, mood disorder, DM2 and hypertension presents with recurrent syncope and collapse.        Syncope and collapse: suspect vasovagal etiology based on negative workup while inpatient  Patient with 3-4 episodes of syncope with collapse prior to admission  Initial labs and hemodynamics unremarkable  EKG not concerning for ACS or arrhythmia  Troponin and BNP normal  TSH normal  COVID-negative  Echocardiogram on admission is normal  Tele monitoring captured intermittent sinus bradycardia in the 40's but did not correlate with any symptoms  CT head and carotid US unremarkable for etiology  -- prolactin level only mildly elevated and likely does not reflect underlying seizure disorder  -- medically stable for discharge with outpatient PCP follow up at [facility]        CKD 3: GFR stable        CAD: Remote  history of CABG over 20 years ago  TTE on admission is normal  --Patient is not on scheduled aspirin or statin  -- Outpatient cardiology follow-up        DM2: no changes to home management        History of reactive airway disease: Continue home albuterol inhaler        Peripheral neuropathy: Continue home gabapentin       Discharge Medications with Med changes:         Review of your medicines      CONTINUE these medicines which have NOT CHANGED      Dose / Directions   albuterol 108 (90 Base) MCG/ACT inhaler  Commonly known as: PROAIR HFA/PROVENTIL HFA/VENTOLIN HFA      Dose: 2 puff  Inhale 2 puffs into the lungs every 4 hours as needed  Refills: 0     aspirin 325 MG tablet  Commonly known as: ASA      Dose: 325 mg  Take 325 mg by mouth every 6 hours as needed  Refills: 0     fluticasone 50 MCG/ACT nasal spray  Commonly known as: FLONASE      Dose: 1 spray  Spray 1 spray into both nostrils daily  Refills: 0     gabapentin 100 MG capsule  Commonly known as: NEURONTIN      Dose: 100 mg  Take 100 mg by mouth 2 times daily  Refills: 0     metFORMIN 500 MG tablet  Commonly known as: GLUCOPHAGE      Dose: 1,000 mg  Take 1,000 mg by mouth daily  Refills: 0                    Discharge Procedure Orders   Reason for your hospital stay   Order Comments: Syncope     Follow-up and recommended labs and tests    Order Comments: Follow up with primary care provider, [facility], within 7 days for hospital follow- up.     Activity   Order Comments: Your activity upon discharge: activity as tolerated     Order Specific Question Answer Comments   Is discharge order? Yes      Diet   Order Comments: Follow this diet upon discharge: Orders Placed This Encounter      Moderate Consistent Carb (60 g CHO per Meal) Diet     Order Specific Question Answer Comments   Is discharge order? Yes          Subjective       NAD. Denies any nausea, vomiting, abdominal pain, chest pain, SOB, new swelling, fevers, chills,  "confusion or headache.     Examination      Vital Signs in last 24 hours:   Vital signs:  Temp: 98.5  F (36.9  C) Temp src: Oral BP: (!) 175/99 Pulse: 58   Resp: 20 SpO2: 97 % O2 Device: None (Room air)   Height: 170.2 cm (5' 7\") Weight: 81.9 kg (180 lb 8 oz)  Estimated body mass index is 28.27 kg/m  as calculated from the following:    Height as of this encounter: 1.702 m (5' 7\").    Weight as of this encounter: 81.9 kg (180 lb 8 oz).            General: NAD  HEENT: Without congestion or inflammation  RESPIRATORY: Respirations nonlabored  CARDIOVASCULAR: No le edema bilat.  ABDOMEN: soft, non-tender   NEUROLOGIC: No focal arm or leg weakness, speech is clear      Please see EMR for more detailed significant labs, imaging, consultant notes etc.  Total time spent on discharge: >30 minutes    Joshua Hernandez D.O.        CC:[facility]        "

## 2022-11-11 NOTE — CONSULTS
Care Management Initial Consult    General Information  Assessment completed with: Patient, pt  Type of CM/SW Visit: Initial Assessment    Primary Care Provider verified and updated as needed: Yes   Readmission within the last 30 days: no previous admission in last 30 days   Return Category: Progression of disease  Reason for Consult: discharge planning  Advance Care Planning: Advance Care Planning Reviewed: no concerns identified, verified with patient          Communication Assessment  Patient's communication style: spoken language (English or Bilingual)    Hearing Difficulty or Deaf: no        Cognitive  Cognitive/Neuro/Behavioral: WDL                      Living Environment:   People in home: alone     Current living Arrangements: apartment      Able to return to prior arrangements: yes       Family/Social Support:  Care provided by: self  Provides care for: no one  Marital Status: Single  Sibling(s)          Description of Support System: Supportive    Support Assessment: Adequate family and caregiver support    Current Resources:   Patient receiving home care services: No     Community Resources: None  Equipment currently used at home: none  Supplies currently used at home: None    Employment/Financial:  Employment Status:          Financial Concerns: No concerns identified   Referral to Financial Worker: No       Lifestyle & Psychosocial Needs:  Social Determinants of Health     Tobacco Use: Not on file   Alcohol Use: Not on file   Financial Resource Strain: Not on file   Food Insecurity: Not on file   Transportation Needs: Not on file   Physical Activity: Not on file   Stress: Not on file   Social Connections: Not on file   Intimate Partner Violence: Not on file   Depression: Not on file   Housing Stability: Not on file       Functional Status:  Prior to admission patient needed assistance:   Dependent ADLs:: Independent, Ambulation-cane  Dependent IADLs:: Independent  Assesssment of Functional Status: Not  at  functional baseline    Mental Health Status:  Mental Health Status: No Current Concerns       Chemical Dependency Status:                Values/Beliefs:  Spiritual, Cultural Beliefs, Confucianism Practices, Values that affect care:                 Additional Information:  Assessed, lives alone and no svcs, independent w/cane. CM to follow. [id number]. He has sister or friend that can transport.      Bozena Glaser RN

## 2022-11-11 NOTE — PLAN OF CARE
PRIMARY DIAGNOSIS: SYNCOPE/TIA  OUTPATIENT/OBSERVATION GOALS TO BE MET BEFORE DISCHARGE:  1. Orthostatic performed: No    2. Diagnostic testing complete & at baseline neurologic testing: Yes    3. Cleared by consultants (if involved): No    4. Interpretation of cardiac rhythm per telemetry tech: SB with 1st degree AV Block, BBB    5. Tolerating adequate PO diet and medications: Yes    6. Return to near baseline physical activity or neurologic status: Yes    Discharge Planner Nurse   Safe discharge environment identified: Yes  Barriers to discharge: Yes. Pt is sinus bradycardia down to high 40's seen on telemetry even when awake. Pt asymptomatic.          Entered by: Zechariah Santana RN 11/11/2022 5:28 AM     Please review provider order for any additional goals.   Nurse to notify provider when observation goals have been met and patient is ready for discharge.

## 2022-11-11 NOTE — PLAN OF CARE
Problem: Syncope  Goal: Absence of Syncopal Symptoms  Outcome: Progressing   Goal Outcome Evaluation:    Came from the ED at 2030.  BP left arm 190/87.  BP right arm 158/78.  Orthostatic BP was done it was negative.  NSR 1st degree AVB BBB.  Blood sugar 99.   Pt is steady on his feet.  A & O x 4.  RA.  Edema 2+ lower legs and feet.  Lungs sounds course and has a cough.  Pt did go bradycardic 51 laying in bed reading.  Asymptomatic.

## 2022-11-11 NOTE — PROGRESS NOTES
11/11/22 0900   Appointment Info   Signing Clinician's Name / Credentials (OT) beryl blancas OT   Appointment Canceled Reason (OT) Other (see Cancel Comments row)   Appointment Cancel Comments (OT) Per PT report/pt independent with mobility-adl's/no eval completed/will complete the order.

## 2022-11-11 NOTE — PLAN OF CARE
Goal Outcome Evaluation:      Plan of Care Reviewed With: patient        PRIMARY DIAGNOSIS: SYNCOPE/TIA  OUTPATIENT/OBSERVATION GOALS TO BE MET BEFORE DISCHARGE:  1. Orthostatic performed: Yes:          Lying Orthostatic BP: (P) 150/67 (right arm)         Sitting Orthostatic BP: 158/78         Standing Orthostatic BP: 158/94     2. Diagnostic testing complete & at baseline neurologic testing: Yes    3. Cleared by consultants (if involved): Yes    4. Interpretation of cardiac rhythm per telemetry tech: sinus silvino PACs    5. Tolerating adequate PO diet and medications: Yes    6. Return to near baseline physical activity or neurologic status: Yes    Discharge Planner Nurse   Safe discharge environment identified: Yes  Barriers to discharge: No       Entered by: Linsey Espinal RN 11/11/2022 10:12 AM     Please review provider order for any additional goals.   Nurse to notify provider when observation goals have been met and patient is ready for discharge.       Pt discharging to home in stable condition.  BP elevated 175/99.  Pt asymptomatic and Hospitalist aware.  [facility]  Pt does not have ride to his home.  Waiver was signed to release information to [employer] and placed in chart.

## 2022-11-11 NOTE — ED NOTES
"[facility] ED Handoff Report    ED Chief Complaint: Syncope    ED Diagnosis:  (R55) Syncope, unspecified syncope type  Comment:   Plan:        PMH:    Past Medical History:   Diagnosis Date    CAD (coronary artery disease)     CKD (chronic kidney disease) stage 3, GFR 30-59 ml/min (H) 11/10/2022    Diabetes mellitus type 2 without retinopathy (H) 11/10/2022    History of coronary artery bypass graft 11/10/2022    Recurrent major depressive disorder, in partial remission (H) 11/10/2022        Code Status:  No CPR- Do NOT Intubate     Falls Risk: Yes Band: Applied    Current Living Situation/Residence: lives alone     Elimination Status: Continent: Yes     Activity Level: SBA    Patients Preferred Language:  English     Needed: No    Vital Signs:  BP (!) 184/92   Pulse 73   Temp 98.3  F (36.8  C) (Oral)   Resp 26   Ht 1.702 m (5' 7\")   Wt 81.6 kg (180 lb)   SpO2 100%   BMI 28.19 kg/m       Cardiac Rhythm: NSR    Pain Score: 0/10    Is the Patient Confused:  No    Last Food or Drink: 11/10/22 at Dinner at 1700    Focused Assessment:  Patient came in after 5-6 syncopal episode while helping out at the food shelter. Patient has an extensive cardiac hx with 6 stents being placed. Work up in ER has been normal. ECHO preformed, looked good. Patient has diabetes  and sugars have been fine for me. Dinner BS was 90    Tests Performed: Done: Labs and Imaging    Treatments Provided:  medication    Family Dynamics/Concerns: No    Family Updated On Visitor Policy: Yes    Plan of Care Communicated to Family: No    Who Was Updated about Plan of Care: Patient able to update     Belongings Checklist Done and Signed by Patient: No    Medications sent with patient: insulin pen    Covid: asymptomatic , negative    Additional Information:     RN: Micah Ayala RN   11/10/2022 6:30 PM      "

## 2022-11-11 NOTE — PLAN OF CARE
PRIMARY DIAGNOSIS: SYNCOPE/TIA  OUTPATIENT/OBSERVATION GOALS TO BE MET BEFORE DISCHARGE:  1. Orthostatic performed: No    2. Diagnostic testing complete & at baseline neurologic testing: Yes    3. Cleared by consultants (if involved): No    4. Interpretation of cardiac rhythm per telemetry tech: SB with 1st Degree AV Block, BBB    5. Tolerating adequate PO diet and medications: Yes    6. Return to near baseline physical activity or neurologic status: Yes    Discharge Planner Nurse   Safe discharge environment identified: Yes  Barriers to discharge: Yes, monitor on telemetry overnight         Entered by: Zechariah Santana RN 11/11/2022 12:51 AM     Please review provider order for any additional goals.   Nurse to notify provider when observation goals have been met and patient is ready for discharge.

## 2022-11-13 ENCOUNTER — PATIENT OUTREACH (OUTPATIENT)
Dept: CARE COORDINATION | Facility: CLINIC | Age: 77
End: 2022-11-13

## 2022-11-14 ENCOUNTER — PATIENT OUTREACH (OUTPATIENT)
Dept: CARE COORDINATION | Facility: CLINIC | Age: 77
End: 2022-11-14

## 2022-11-14 NOTE — PROGRESS NOTES
"[facility] Resource Center Contact  [facility]/Voicemail     Clinical Data: Transitional Care Management Outreach     Outreach attempted x 2. No Answer/Busy - Patient's phone rang for several minutes, then went to an automated voice message system, \"we're sorry your call cannot be completed at this time please hang up and try again later.\"  CHW was unable to leave a message on patient's voicemail, providing [facility]'s 24/7 scheduling and nurse triage phone number [phone] ([phone]) for questions/concerns and/or to schedule an appt with an [facility] provider, if they do not have a PCP.      Plan:  [facility] will do no further outreaches at this time.     KIAN Rosado  [phone]  [facility]    *Connected Care Resource Team does NOT follow patient ongoing. Referrals are identified based on internal discharge reports and the outreach is to ensure patient has an understanding of their discharge instructions.  "

## 2024-01-01 ENCOUNTER — APPOINTMENT (OUTPATIENT)
Dept: CT IMAGING | Facility: HOSPITAL | Age: 79
DRG: 309 | End: 2024-01-01
Attending: EMERGENCY MEDICINE
Payer: COMMERCIAL

## 2024-01-01 ENCOUNTER — TRANSFERRED RECORDS (OUTPATIENT)
Dept: MEDSURG UNIT | Facility: HOSPITAL | Age: 79
End: 2024-01-01

## 2024-01-01 ENCOUNTER — PATIENT OUTREACH (OUTPATIENT)
Dept: CARE COORDINATION | Facility: CLINIC | Age: 79
End: 2024-01-01

## 2024-01-01 ENCOUNTER — APPOINTMENT (OUTPATIENT)
Dept: OCCUPATIONAL THERAPY | Facility: HOSPITAL | Age: 79
DRG: 309 | End: 2024-01-01
Payer: COMMERCIAL

## 2024-01-01 ENCOUNTER — HOSPITAL ENCOUNTER (INPATIENT)
Facility: HOSPITAL | Age: 79
LOS: 1 days | Discharge: HOME | DRG: 309 | End: 2024-08-08
Attending: EMERGENCY MEDICINE | Admitting: FAMILY MEDICINE
Payer: COMMERCIAL

## 2024-01-01 VITALS
DIASTOLIC BLOOD PRESSURE: 73 MMHG | RESPIRATION RATE: 18 BRPM | HEART RATE: 62 BPM | WEIGHT: 165.3 LBS | OXYGEN SATURATION: 96 % | SYSTOLIC BLOOD PRESSURE: 136 MMHG | TEMPERATURE: 97.5 F | HEIGHT: 69 IN | BODY MASS INDEX: 24.48 KG/M2

## 2024-01-01 DIAGNOSIS — I49.3 FREQUENT PVCS: ICD-10-CM

## 2024-01-01 DIAGNOSIS — R91.8 LUNG MASS: ICD-10-CM

## 2024-01-01 DIAGNOSIS — R00.1 SINUS BRADYCARDIA: ICD-10-CM

## 2024-01-01 DIAGNOSIS — J43.9 PULMONARY EMPHYSEMA, UNSPECIFIED EMPHYSEMA TYPE (H): ICD-10-CM

## 2024-01-01 DIAGNOSIS — R55 SYNCOPE, UNSPECIFIED SYNCOPE TYPE: ICD-10-CM

## 2024-01-01 LAB
ANION GAP SERPL CALCULATED.3IONS-SCNC: 10 MMOL/L (ref 7–15)
ANION GAP SERPL CALCULATED.3IONS-SCNC: 13 MMOL/L (ref 7–15)
ATRIAL RATE - MUSE: 63 BPM
BASOPHILS # BLD AUTO: 0.1 10E3/UL (ref 0–0.2)
BASOPHILS NFR BLD AUTO: 1 %
BUN SERPL-MCNC: 18.2 MG/DL (ref 8–23)
BUN SERPL-MCNC: 18.9 MG/DL (ref 8–23)
CALCIUM SERPL-MCNC: 8.8 MG/DL (ref 8.8–10.4)
CALCIUM SERPL-MCNC: 8.9 MG/DL (ref 8.8–10.4)
CHLORIDE SERPL-SCNC: 103 MMOL/L (ref 98–107)
CHLORIDE SERPL-SCNC: 106 MMOL/L (ref 98–107)
CREAT SERPL-MCNC: 1.28 MG/DL (ref 0.67–1.17)
CREAT SERPL-MCNC: 1.33 MG/DL (ref 0.67–1.17)
D DIMER PPP FEU-MCNC: 0.82 UG/ML FEU (ref 0–0.5)
DIASTOLIC BLOOD PRESSURE - MUSE: 66 MMHG
EGFRCR SERPLBLD CKD-EPI 2021: 54 ML/MIN/1.73M2
EGFRCR SERPLBLD CKD-EPI 2021: 57 ML/MIN/1.73M2
EOSINOPHIL # BLD AUTO: 0.2 10E3/UL (ref 0–0.7)
EOSINOPHIL NFR BLD AUTO: 2 %
ERYTHROCYTE [DISTWIDTH] IN BLOOD BY AUTOMATED COUNT: 14.4 % (ref 10–15)
ERYTHROCYTE [DISTWIDTH] IN BLOOD BY AUTOMATED COUNT: 14.5 % (ref 10–15)
GLUCOSE BLDC GLUCOMTR-MCNC: 131 MG/DL (ref 70–99)
GLUCOSE BLDC GLUCOMTR-MCNC: 80 MG/DL (ref 70–99)
GLUCOSE BLDC GLUCOMTR-MCNC: 83 MG/DL (ref 70–99)
GLUCOSE BLDC GLUCOMTR-MCNC: 84 MG/DL (ref 70–99)
GLUCOSE SERPL-MCNC: 110 MG/DL (ref 70–99)
GLUCOSE SERPL-MCNC: 82 MG/DL (ref 70–99)
HBA1C MFR BLD: 5.7 %
HCO3 SERPL-SCNC: 22 MMOL/L (ref 22–29)
HCO3 SERPL-SCNC: 26 MMOL/L (ref 22–29)
HCT VFR BLD AUTO: 38.6 % (ref 40–53)
HCT VFR BLD AUTO: 39 % (ref 40–53)
HGB BLD-MCNC: 12.7 G/DL (ref 13.3–17.7)
HGB BLD-MCNC: 13 G/DL (ref 13.3–17.7)
HOLD SPECIMEN: NORMAL
IMM GRANULOCYTES # BLD: 0 10E3/UL
IMM GRANULOCYTES NFR BLD: 0 %
INR PPP: 1.12 (ref 0.85–1.15)
INTERPRETATION ECG - MUSE: NORMAL
LYMPHOCYTES # BLD AUTO: 2 10E3/UL (ref 0.8–5.3)
LYMPHOCYTES NFR BLD AUTO: 27 %
MAGNESIUM SERPL-MCNC: 1.8 MG/DL (ref 1.7–2.3)
MCH RBC QN AUTO: 29.2 PG (ref 26.5–33)
MCH RBC QN AUTO: 30 PG (ref 26.5–33)
MCHC RBC AUTO-ENTMCNC: 32.6 G/DL (ref 31.5–36.5)
MCHC RBC AUTO-ENTMCNC: 33.7 G/DL (ref 31.5–36.5)
MCV RBC AUTO: 89 FL (ref 78–100)
MCV RBC AUTO: 90 FL (ref 78–100)
MONOCYTES # BLD AUTO: 0.7 10E3/UL (ref 0–1.3)
MONOCYTES NFR BLD AUTO: 10 %
NEUTROPHILS # BLD AUTO: 4.5 10E3/UL (ref 1.6–8.3)
NEUTROPHILS NFR BLD AUTO: 60 %
NRBC # BLD AUTO: 0 10E3/UL
NRBC BLD AUTO-RTO: 0 /100
P AXIS - MUSE: 64 DEGREES
PLATELET # BLD AUTO: 187 10E3/UL (ref 150–450)
PLATELET # BLD AUTO: 202 10E3/UL (ref 150–450)
POTASSIUM SERPL-SCNC: 4.1 MMOL/L (ref 3.4–5.3)
POTASSIUM SERPL-SCNC: 4.3 MMOL/L (ref 3.4–5.3)
PR INTERVAL - MUSE: 276 MS
QRS DURATION - MUSE: 166 MS
QT - MUSE: 456 MS
QTC - MUSE: 466 MS
R AXIS - MUSE: 75 DEGREES
RBC # BLD AUTO: 4.34 10E6/UL (ref 4.4–5.9)
RBC # BLD AUTO: 4.35 10E6/UL (ref 4.4–5.9)
SODIUM SERPL-SCNC: 139 MMOL/L (ref 135–145)
SODIUM SERPL-SCNC: 141 MMOL/L (ref 135–145)
SYSTOLIC BLOOD PRESSURE - MUSE: 120 MMHG
T AXIS - MUSE: 51 DEGREES
TROPONIN T SERPL HS-MCNC: 17 NG/L
VENTRICULAR RATE- MUSE: 63 BPM
WBC # BLD AUTO: 10.9 10E3/UL (ref 4–11)
WBC # BLD AUTO: 7.5 10E3/UL (ref 4–11)

## 2024-01-01 PROCEDURE — 84484 ASSAY OF TROPONIN QUANT: CPT | Performed by: EMERGENCY MEDICINE

## 2024-01-01 PROCEDURE — 80048 BASIC METABOLIC PNL TOTAL CA: CPT | Performed by: EMERGENCY MEDICINE

## 2024-01-01 PROCEDURE — G1010 CDSM STANSON: HCPCS

## 2024-01-01 PROCEDURE — 85610 PROTHROMBIN TIME: CPT | Performed by: EMERGENCY MEDICINE

## 2024-01-01 PROCEDURE — 36415 COLL VENOUS BLD VENIPUNCTURE: CPT

## 2024-01-01 PROCEDURE — 85027 COMPLETE CBC AUTOMATED: CPT

## 2024-01-01 PROCEDURE — 93005 ELECTROCARDIOGRAM TRACING: CPT | Performed by: EMERGENCY MEDICINE

## 2024-01-01 PROCEDURE — 97535 SELF CARE MNGMENT TRAINING: CPT | Mod: GO

## 2024-01-01 PROCEDURE — 250N000011 HC RX IP 250 OP 636

## 2024-01-01 PROCEDURE — 99285 EMERGENCY DEPT VISIT HI MDM: CPT | Mod: 25

## 2024-01-01 PROCEDURE — 83735 ASSAY OF MAGNESIUM: CPT | Performed by: EMERGENCY MEDICINE

## 2024-01-01 PROCEDURE — 250N000013 HC RX MED GY IP 250 OP 250 PS 637

## 2024-01-01 PROCEDURE — 85379 FIBRIN DEGRADATION QUANT: CPT | Performed by: EMERGENCY MEDICINE

## 2024-01-01 PROCEDURE — 83036 HEMOGLOBIN GLYCOSYLATED A1C: CPT

## 2024-01-01 PROCEDURE — 210N000001 HC R&B IMCU HEART CARE

## 2024-01-01 PROCEDURE — 80048 BASIC METABOLIC PNL TOTAL CA: CPT

## 2024-01-01 PROCEDURE — 99238 HOSP IP/OBS DSCHRG MGMT 30/<: CPT | Mod: GC

## 2024-01-01 PROCEDURE — 250N000011 HC RX IP 250 OP 636: Performed by: EMERGENCY MEDICINE

## 2024-01-01 PROCEDURE — 70450 CT HEAD/BRAIN W/O DYE: CPT | Mod: MG

## 2024-01-01 PROCEDURE — 99222 1ST HOSP IP/OBS MODERATE 55: CPT | Mod: AI

## 2024-01-01 PROCEDURE — 85025 COMPLETE CBC W/AUTO DIFF WBC: CPT | Performed by: EMERGENCY MEDICINE

## 2024-01-01 PROCEDURE — 97165 OT EVAL LOW COMPLEX 30 MIN: CPT | Mod: GO

## 2024-01-01 PROCEDURE — 36415 COLL VENOUS BLD VENIPUNCTURE: CPT | Performed by: EMERGENCY MEDICINE

## 2024-01-01 RX ORDER — ALBUTEROL SULFATE 90 UG/1
2 AEROSOL, METERED RESPIRATORY (INHALATION) EVERY 4 HOURS PRN
Status: DISCONTINUED | OUTPATIENT
Start: 2024-01-01 | End: 2024-01-01 | Stop reason: HOSPADM

## 2024-01-01 RX ORDER — ASPIRIN 81 MG/1
81 TABLET ORAL DAILY
COMMUNITY

## 2024-01-01 RX ORDER — IOPAMIDOL 755 MG/ML
75 INJECTION, SOLUTION INTRAVASCULAR ONCE
Status: COMPLETED | OUTPATIENT
Start: 2024-01-01 | End: 2024-01-01

## 2024-01-01 RX ORDER — PANTOPRAZOLE SODIUM 40 MG/1
40 TABLET, DELAYED RELEASE ORAL
Status: DISCONTINUED | OUTPATIENT
Start: 2024-01-01 | End: 2024-01-01 | Stop reason: HOSPADM

## 2024-01-01 RX ORDER — PROCHLORPERAZINE MALEATE 5 MG
5 TABLET ORAL EVERY 6 HOURS PRN
Status: DISCONTINUED | OUTPATIENT
Start: 2024-01-01 | End: 2024-01-01 | Stop reason: HOSPADM

## 2024-01-01 RX ORDER — ONDANSETRON 2 MG/ML
4 INJECTION INTRAMUSCULAR; INTRAVENOUS EVERY 6 HOURS PRN
Status: DISCONTINUED | OUTPATIENT
Start: 2024-01-01 | End: 2024-01-01 | Stop reason: HOSPADM

## 2024-01-01 RX ORDER — OXYBUTYNIN CHLORIDE 5 MG/1
5 TABLET ORAL 2 TIMES DAILY
Status: DISCONTINUED | OUTPATIENT
Start: 2024-01-01 | End: 2024-01-01 | Stop reason: HOSPADM

## 2024-01-01 RX ORDER — LISINOPRIL 10 MG/1
10 TABLET ORAL DAILY
COMMUNITY

## 2024-01-01 RX ORDER — LIDOCAINE 50 MG/G
PATCH TOPICAL 2 TIMES DAILY PRN
COMMUNITY
Start: 2024-01-01

## 2024-01-01 RX ORDER — ATORVASTATIN CALCIUM 40 MG/1
40 TABLET, FILM COATED ORAL DAILY
Status: DISCONTINUED | OUTPATIENT
Start: 2024-01-01 | End: 2024-01-01 | Stop reason: HOSPADM

## 2024-01-01 RX ORDER — OXYBUTYNIN CHLORIDE 5 MG/1
5 TABLET ORAL 2 TIMES DAILY
COMMUNITY

## 2024-01-01 RX ORDER — POLYETHYLENE GLYCOL 3350 17 G/17G
17 POWDER, FOR SOLUTION ORAL 2 TIMES DAILY PRN
Status: DISCONTINUED | OUTPATIENT
Start: 2024-01-01 | End: 2024-01-01 | Stop reason: HOSPADM

## 2024-01-01 RX ORDER — AMOXICILLIN 250 MG
2 CAPSULE ORAL 2 TIMES DAILY PRN
Status: DISCONTINUED | OUTPATIENT
Start: 2024-01-01 | End: 2024-01-01 | Stop reason: HOSPADM

## 2024-01-01 RX ORDER — ATORVASTATIN CALCIUM 40 MG/1
40 TABLET, FILM COATED ORAL DAILY
COMMUNITY
Start: 2024-01-01

## 2024-01-01 RX ORDER — AMOXICILLIN 250 MG
1 CAPSULE ORAL 2 TIMES DAILY PRN
Status: DISCONTINUED | OUTPATIENT
Start: 2024-01-01 | End: 2024-01-01 | Stop reason: HOSPADM

## 2024-01-01 RX ORDER — ASPIRIN 81 MG/1
81 TABLET ORAL DAILY
Status: DISCONTINUED | OUTPATIENT
Start: 2024-01-01 | End: 2024-01-01 | Stop reason: HOSPADM

## 2024-01-01 RX ORDER — CALCIUM CARBONATE 500 MG/1
1000 TABLET, CHEWABLE ORAL 4 TIMES DAILY PRN
Status: DISCONTINUED | OUTPATIENT
Start: 2024-01-01 | End: 2024-01-01 | Stop reason: HOSPADM

## 2024-01-01 RX ORDER — ONDANSETRON 4 MG/1
4 TABLET, ORALLY DISINTEGRATING ORAL EVERY 6 HOURS PRN
Status: DISCONTINUED | OUTPATIENT
Start: 2024-01-01 | End: 2024-01-01 | Stop reason: HOSPADM

## 2024-01-01 RX ORDER — SIMVASTATIN 20 MG
20 TABLET ORAL AT BEDTIME
Status: ON HOLD | COMMUNITY
End: 2024-01-01

## 2024-01-01 RX ORDER — LIDOCAINE 4 G/G
1 PATCH TOPICAL DAILY PRN
Status: DISCONTINUED | OUTPATIENT
Start: 2024-01-01 | End: 2024-01-01 | Stop reason: HOSPADM

## 2024-01-01 RX ORDER — NICOTINE POLACRILEX 4 MG
15-30 LOZENGE BUCCAL
Status: DISCONTINUED | OUTPATIENT
Start: 2024-01-01 | End: 2024-01-01 | Stop reason: HOSPADM

## 2024-01-01 RX ORDER — DEXTROSE MONOHYDRATE 25 G/50ML
25-50 INJECTION, SOLUTION INTRAVENOUS
Status: DISCONTINUED | OUTPATIENT
Start: 2024-01-01 | End: 2024-01-01 | Stop reason: HOSPADM

## 2024-01-01 RX ORDER — FLUTICASONE PROPIONATE 50 MCG
1 SPRAY, SUSPENSION (ML) NASAL DAILY PRN
Status: DISCONTINUED | OUTPATIENT
Start: 2024-01-01 | End: 2024-01-01 | Stop reason: HOSPADM

## 2024-01-01 RX ORDER — LISINOPRIL 5 MG/1
10 TABLET ORAL DAILY
Status: DISCONTINUED | OUTPATIENT
Start: 2024-01-01 | End: 2024-01-01 | Stop reason: HOSPADM

## 2024-01-01 RX ORDER — GABAPENTIN 100 MG/1
100 CAPSULE ORAL EVERY MORNING
Status: DISCONTINUED | OUTPATIENT
Start: 2024-01-01 | End: 2024-01-01 | Stop reason: HOSPADM

## 2024-01-01 RX ORDER — LANOLIN ALCOHOL/MO/W.PET/CERES
1000 CREAM (GRAM) TOPICAL DAILY
Status: DISCONTINUED | OUTPATIENT
Start: 2024-01-01 | End: 2024-01-01 | Stop reason: HOSPADM

## 2024-01-01 RX ORDER — ENOXAPARIN SODIUM 100 MG/ML
40 INJECTION SUBCUTANEOUS EVERY 24 HOURS
Status: DISCONTINUED | OUTPATIENT
Start: 2024-01-01 | End: 2024-01-01 | Stop reason: HOSPADM

## 2024-01-01 RX ORDER — PROCHLORPERAZINE 25 MG
12.5 SUPPOSITORY, RECTAL RECTAL EVERY 12 HOURS PRN
Status: DISCONTINUED | OUTPATIENT
Start: 2024-01-01 | End: 2024-01-01 | Stop reason: HOSPADM

## 2024-01-01 RX ORDER — IPRATROPIUM BROMIDE 42 UG/1
2 SPRAY, METERED NASAL 4 TIMES DAILY PRN
COMMUNITY
Start: 2024-01-01

## 2024-01-01 RX ORDER — GABAPENTIN 100 MG/1
200 CAPSULE ORAL EVERY EVENING
Status: DISCONTINUED | OUTPATIENT
Start: 2024-01-01 | End: 2024-01-01 | Stop reason: HOSPADM

## 2024-01-01 RX ORDER — IPRATROPIUM BROMIDE 42 UG/1
2 SPRAY, METERED NASAL 4 TIMES DAILY PRN
Status: DISCONTINUED | OUTPATIENT
Start: 2024-01-01 | End: 2024-01-01 | Stop reason: HOSPADM

## 2024-01-01 RX ORDER — LIDOCAINE 40 MG/G
CREAM TOPICAL
Status: DISCONTINUED | OUTPATIENT
Start: 2024-01-01 | End: 2024-01-01 | Stop reason: HOSPADM

## 2024-01-01 RX ADMIN — IOPAMIDOL 75 ML: 755 INJECTION, SOLUTION INTRAVENOUS at 14:14

## 2024-01-01 RX ADMIN — ENOXAPARIN SODIUM 40 MG: 40 INJECTION SUBCUTANEOUS at 19:18

## 2024-01-01 RX ADMIN — GABAPENTIN 200 MG: 100 CAPSULE ORAL at 21:27

## 2024-01-01 RX ADMIN — ATORVASTATIN CALCIUM 40 MG: 40 TABLET, FILM COATED ORAL at 08:14

## 2024-01-01 RX ADMIN — CYANOCOBALAMIN TAB 1000 MCG 1000 MCG: 1000 TAB at 08:14

## 2024-01-01 RX ADMIN — OXYBUTYNIN CHLORIDE 5 MG: 5 TABLET ORAL at 08:15

## 2024-01-01 RX ADMIN — PANTOPRAZOLE SODIUM 40 MG: 40 TABLET, DELAYED RELEASE ORAL at 07:01

## 2024-01-01 RX ADMIN — LISINOPRIL 10 MG: 5 TABLET ORAL at 08:14

## 2024-01-01 RX ADMIN — GABAPENTIN 100 MG: 100 CAPSULE ORAL at 08:14

## 2024-01-01 RX ADMIN — ASPIRIN 81 MG: 81 TABLET, COATED ORAL at 08:15

## 2024-01-01 RX ADMIN — OXYBUTYNIN CHLORIDE 5 MG: 5 TABLET ORAL at 21:27

## 2024-01-01 ASSESSMENT — ACTIVITIES OF DAILY LIVING (ADL)
ADLS_ACUITY_SCORE: 26
ADLS_ACUITY_SCORE: 35
ADLS_ACUITY_SCORE: 35
ADLS_ACUITY_SCORE: 32
ADLS_ACUITY_SCORE: 26
WEAR_GLASSES_OR_BLIND: YES
ADLS_ACUITY_SCORE: 32
ADLS_ACUITY_SCORE: 39
DEPENDENT_IADLS:: INDEPENDENT
ADLS_ACUITY_SCORE: 32
ADLS_ACUITY_SCORE: 32
ADLS_ACUITY_SCORE: 26
ADLS_ACUITY_SCORE: 28
ADLS_ACUITY_SCORE: 32
NUMBER_OF_TIMES_PATIENT_HAS_FALLEN_WITHIN_LAST_SIX_MONTHS: 1
TOILETING_ISSUES: NO
CONCENTRATING,_REMEMBERING_OR_MAKING_DECISIONS_DIFFICULTY: NO
DRESSING/BATHING_DIFFICULTY: NO
ADLS_ACUITY_SCORE: 35
ADLS_ACUITY_SCORE: 32
ADLS_ACUITY_SCORE: 26
CHANGE_IN_FUNCTIONAL_STATUS_SINCE_ONSET_OF_CURRENT_ILLNESS/INJURY: NO
ADLS_ACUITY_SCORE: 26
WALKING_OR_CLIMBING_STAIRS: AMBULATION DIFFICULTY, REQUIRES EQUIPMENT
ADLS_ACUITY_SCORE: 26
ADLS_ACUITY_SCORE: 35
ADLS_ACUITY_SCORE: 28
ADLS_ACUITY_SCORE: 32
FALL_HISTORY_WITHIN_LAST_SIX_MONTHS: YES
WALKING_OR_CLIMBING_STAIRS_DIFFICULTY: YES
DIFFICULTY_COMMUNICATING: NO
EQUIPMENT_CURRENTLY_USED_AT_HOME: CANE, STRAIGHT
DIFFICULTY_EATING/SWALLOWING: NO
ADLS_ACUITY_SCORE: 32
ADLS_ACUITY_SCORE: 28
HEARING_DIFFICULTY_OR_DEAF: NO
ADLS_ACUITY_SCORE: 28
DOING_ERRANDS_INDEPENDENTLY_DIFFICULTY: NO
ADLS_ACUITY_SCORE: 35

## 2024-08-07 PROBLEM — R00.1 SINUS BRADYCARDIA: Status: ACTIVE | Noted: 2024-01-01

## 2024-08-07 PROBLEM — I49.3 FREQUENT PVCS: Status: ACTIVE | Noted: 2024-01-01

## 2024-08-07 PROBLEM — J43.9 PULMONARY EMPHYSEMA, UNSPECIFIED EMPHYSEMA TYPE (H): Status: ACTIVE | Noted: 2024-01-01

## 2024-08-07 PROBLEM — R55 SYNCOPE, UNSPECIFIED SYNCOPE TYPE: Status: ACTIVE | Noted: 2024-01-01

## 2024-08-07 PROBLEM — R91.8 LUNG MASS: Status: ACTIVE | Noted: 2024-01-01

## 2024-08-07 NOTE — H&P
[facility]    History and Physical - Hospitalist Service       Date of Admission:  8/7/2024    Assessment & Plan      Harpreet Tovar is a 78 year old male admitted on 8/7/2024. He has a history of type 2 diabetes, CKD, hyperlipidemia, CAD s/p CABG, CVA, possible lung cancer and is admitted for syncope.    Syncope  Fall  Patient with fall and reported syncope outside of his senior living facility.  Reports no prodromal symptoms prior to the fall and recalls most events afterward.  Per EMS report, he was initially hypotensive and had another episode of syncope/near syncope while with them.  Given 400 mL of fluid by EMS.  He was hospitalized here at 2022 for syncopal episodes with no cause identified. [facility] and having a 2-week Holter monitor.  Denies chest pain, shortness of breath, or significant palpitations.  Troponin negative.  D-dimer mildly elevated with CT PE showing no pulmonary embolism. CT head and cervical spine unremarkable. EKG revealing sinus bradycardia with first-degree AV block, frequent PVCs, right bundle branch block.  Differential includes arrhythmia, vasovagal, orthostatic hypotension, mechanical fall, seizure, stroke.  Seems most likely this is either orthostatic with the hypotension reported by EMS or related to arrhythmia with his first-degree AV block, bradycardia and frequent PVCs. Suspect he could have occasional extended pauses that could lead to these syncopal events.  Although he reports not tripping, he does have peripheral neuropathy and ambulates with a cane, so must question if there could be some mechanical contribution as well.  -Cardiac telemetry to evaluate for pauses, arrhythmias  -Orthostatic BP's  -PT to eval gait   -Consider Holter monitor after discharge    First-degree AV block  Sinus bradycardia  Frequent PVCs  Noted to be bradycardic on arrival, EKG showing sinus  bradycardia with first-degree AV block, frequent PVCs, and a right bundle branch block.  Prior EKGs on file from 2022 showing first-degree AV block and right bundle but normal rate and no PVCs.  Rate appears to be stable in the 50s to 60s.  He does not appear to be on any beta-blockers.  -Cardiac telemetry  -Daily BMP    History of CVA  Patient with reported stroke in January of this year.  No residual neurologic deficits.  [facility]  Appears to be taking aspirin but no evidence of him taking clopidogrel.  -PTA baby aspirin    CAD s/p CABG  Remote history of CABG with reported 6 vessel bypass 24 years ago.  EKG performed in the ED with no ST changes.  Troponin negative.  Patient denies current symptoms of chest pain.    Type 2 diabetes  History of type 2 diabetes but very well-controlled. Hemoglobin A1c 5.7% here on admission.  Only taking metformin for diabetic control prior to admission.  -Hold PTA metformin  -Sliding scale insulin available if needed    Lung nodule  Possible lung cancer  Small left lower lobe nodule noted on CT scan here.  Patient reports this was identified approximately 8 months ago [facility] and that it is lung cancer.  Not currently receiving any cancer therapy.    Emphysema  Current smoker  Currently smokes about half pack per day.  Mild emphysema noted on CT scan.  Patient reports having inhalers at home but never using them.  Satting well on room air with minimal expiratory wheezes on exam.  -PTA albuterol inhaler as needed    CKD stage III  Creatinine of 1.28 here on admission, which appears to be his baseline.    Chronic Problems  BPH - PTA oxybutynin  HTN - PTA lisinopril  GERD - PTA pantoprazole  Hyperlipidemia - PTA atorvastatin  Peripheral neuropathy - PTA gabapentin        Diet: Low Saturated Fat Na <2400 mg  DVT Prophylaxis: Enoxaparin (Lovenox) SQ  Alcantar Catheter: Not present  Fluids: None  Lines: None     Cardiac Monitoring: ACTIVE order. Indication: Syncope- low  "cardiac risk (24 hours)  Code Status: No CPR- Do NOT Intubate    Clinically Significant Risk Factors Present on Admission                # Drug Induced Platelet Defect: home medication list includes an antiplatelet medication   # Hypertension: Home medication list includes antihypertensive(s)     # Acute Hypoxic Respiratory Failure: Documented O2 saturation < 90%. Continue supplemental oxygen as needed        # Overweight: Estimated body mass index is 25.47 kg/m  as calculated from the following:    Height as of this encounter: 1.74 m (5' 8.5\").    Weight as of this encounter: 77.1 kg (170 lb).                    Disposition Plan      Expected Discharge Date: 08/09/2024                The patient's care was discussed with the Attending Physician, Dr. Henry .      Sanchez Worrell MD  Hospitalist [facility]  Securely message with [facility] (more info)  Text page via [facility] Paging/Directory   ______________________________________________________________________    Chief Complaint   Syncope, fall    History is obtained from the patient    History of Present Illness   Harpreet Tovar is a 78 year old male who has a history of type 2 diabetes, CKD, hyperlipidemia, CAD s/p CABG, CVA, possible lung cancer and is admitted for syncope.    Patient reports going for a 4-5 block walk around his senior living facility earlier today.  Was then stepping off of a small ledge outside when he recalls feeling like he was going to fall and then being on the ground in the wet grass.  Reports falling forward onto the grass hitting his face on the ground.  Thinks he may have briefly lost consciousness but remembers all events leading up to and most events after falling.  States he did not trip or lose his balance.  Denies any lightheadedness, dizziness, closing in of vision prior to the fall.  It happened very suddenly.  Reports feeling tired afterwards while being evaluated by EMS.  Currently denies any " pain, shortness of breath, dizziness, lightheadedness.  No recent illness.  Reports eating fairly well but having some difficulty obtaining food from time to time due to distance required to get to the bus stop.  He does report fainting in his younger years to the sight of blood and [facility]  Does not remember any cause ever being identified for his previous syncope.    CT PE performed in the emergency room showing a small lung nodule.  He states he was diagnosed with lung cancer with the nodule first being identified approximately 8 months ago.  He is not receiving any cancer treatment currently.  [facility]    Current smoker about half pack per day.  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  Reports history of drug use but also approximately 50 years sober from this.  Lives in a senior living apartment by himself.  .  [employer] .      Past Medical History    Past Medical History:   Diagnosis Date    CAD (coronary artery disease)     CKD (chronic kidney disease) stage 3, GFR 30-59 ml/min (H) 11/10/2022    Diabetes mellitus type 2 without retinopathy (H) 11/10/2022    History of coronary artery bypass graft 11/10/2022    Recurrent major depressive disorder, in partial remission (H) 11/10/2022       Past Surgical History   Past Surgical History:   Procedure Laterality Date    history of CABG         Prior to Admission Medications   Prior to Admission Medications   Prescriptions Last Dose Informant Patient Reported? Taking?   albuterol (PROAIR HFA/PROVENTIL HFA/VENTOLIN HFA) 108 (90 Base) MCG/ACT inhaler Past Month at prn  Yes Yes   Sig: Inhale 2 puffs into the lungs every 4 hours as needed   aspirin 81 MG EC tablet 8/7/2024 at am  Yes Yes   Sig: Take 81 mg by mouth daily   atorvastatin (LIPITOR) 40 MG tablet Unknown  Yes Yes   Sig: Take 40 mg by mouth daily   diclofenac (VOLTAREN) 1 % topical gel Unknown at prn  Yes Yes   Sig: Apply  topically 4 times daily as needed   fluticasone (FLONASE) 50 MCG/ACT nasal spray Unknown at prn  Yes Yes   Sig: Spray 1 spray into both nostrils daily as needed   gabapentin (NEURONTIN) 100 MG capsule 8/7/2024 at am  Yes Yes   Sig: Take by mouth See Admin Instructions 100mg in the morning   200mg in the evening   ipratropium (ATROVENT) 0.06 % nasal spray Past Week at prn  Yes Yes   Sig: Spray 2 sprays into both nostrils 4 times daily as needed   lidocaine (LIDODERM) 5 % patch Past Month at per pt using prn  Yes Yes   Sig: Apply topically 2 times daily as needed At bedtime for lower back pain   lisinopril (ZESTRIL) 10 MG tablet 8/7/2024 at am  Yes Yes   Sig: Take 10 mg by mouth daily   metFORMIN (GLUCOPHAGE) 500 MG tablet 8/7/2024 at am  Yes Yes   Sig: Take 1,000 mg by mouth daily   omeprazole (PRILOSEC) 20 MG DR capsule 8/7/2024 at am  Yes Yes   Sig: Take 20 mg by mouth daily   oxyBUTYnin (DITROPAN) 5 MG tablet Unknown  Yes Yes   Sig: Take 5 mg by mouth 2 times daily   tiotropium-olodaterol 2.5-2.5 MCG/ACT AERS Unknown  Yes Yes   Sig: Inhale 2 puffs into the lungs daily   vitamin B-12 (CYANOCOBALAMIN) 1000 MCG tablet 8/7/2024 at am  Yes Yes   Sig: Take 1,000 mcg by mouth daily      Facility-Administered Medications: None        Social History   I have reviewed this patient's social history and updated it with pertinent information if needed.         Family History   I have reviewed this patient's family history and updated it with pertinent information if needed.  Family History   Problem Relation Age of Onset    Dementia Mother     Pancreatitis Father          Allergies   Allergies   Allergen Reactions    Tylenol [Acetaminophen] Anxiety and Dizziness     It happened a couple year of ago        Physical Exam   Vital Signs: Temp: 97.9  F (36.6  C) Temp src: Oral BP: (!) 156/83 Pulse: 57   Resp: 20 SpO2: 98 % O2 Device: None (Room air)    Weight: 170 lbs 0 oz  GEN: Pleasant , talkative male, in no acute distress.    HEENT: Normocephalic.  Small abrasion on forehead.  Pupils equal, round, reactive to light.  Minor excoriations and erythema to the nose.  Dried blood noted below bilateral nares.  PULM: Non-labored breathing. No use of accessory muscles.  Diffuse expiratory wheezes.  No crackles.  CV: Bradycardic with frequent extra beats.  No murmurs.  ABDOMEN: Normoactive bowel sounds. Non-tender to palpation. Non-distended.    EXTREMITES: No lower extremity edema.  SKIN: Sternotomy scar on chest.  No other concerning rash or lesion.  NEURO:  Awake.  No gross focal deficits.  Moving all extremities.      Medical Decision Making       Please see A&P for additional details of medical decision making.      Data   ------------------------- PAST 24 HR DATA REVIEWED -----------------------------------------------

## 2024-08-07 NOTE — ED PROVIDER NOTES
EMERGENCY DEPARTMENT ENCOUNTER      NAME: Harpreet Tovar  AGE: 78 year old male  YOB: 1945  MRN: [id number]  EVALUATION DATE & TIME: 8/7/2024 11:10 AM    PCP: [facility]    ED PROVIDER: Nakul Gustafson MD      Chief Complaint   Patient presents with    Syncope         FINAL IMPRESSION:  1. Syncope, unspecified syncope type    2. Frequent PVCs    3. Pulmonary emphysema, unspecified emphysema type (H)    4. Lung mass    5. Sinus bradycardia          ED COURSE & MEDICAL DECISION MAKING:    Pertinent Labs & Imaging studies reviewed. (See chart for details)  78 year old male presents to the Emergency Department for evaluation of syncope    Well-appearing besides abrasion to forehead and nose.  States previous stroke and is on aspirin and unclear if on clopidogrel.  GCS 15.  EKG upon arrival shows lots of PVCs.  Hospitalized syncope in 2022 no cause found    Does smoke.  Does have heart disease.      ED Course as of 08/07/24 1530   Wed Aug 07, 2024   1244 D-Dimer Quantitative(!): 0.82  Abnormal age-adjusted D-dimer will obtain CTA PE   1245 With head trauma obtain CT head and CT neck   1456 Troponin X 2 normal.  Not related for ACS.  No chest pain   1517 CT shows possible malignancy.  Patient states he is aware of this and that she has had a PET scan and schedule a follow-up with a pulmonologist tomorrow regarding the results.   1517 With bradycardia and frequent PVCs and fainting x 2 plan admit to the hospital for ongoing cardiac evaluation   1518 Does not appear patient is on any metoprolol   1528 Spoke with Dr. Worrell who agreed to admit patient         DDX: seizure, subarachnoid hemorrhage, ruptured abdominal aortic aneurysm, aortic dissection, perforated gastric/duodenal ulcer, ruptured ectopic pregnancy, stroke/TIA, acutecoronary syndrome, pulmonary embolism, arrhythmia (atrioventricular block/symptomatic bradycardia, Anny-Parkinson-White syndrome, Brugada syndrome,  hypertrophic cardiomyopathy, long QT syndrome, arrhythmogenic rightventricular dysplasia), aortic stenosis, hypoglycemia, vasovagal, or orthostatic hypotension.     Plan for D-dimer, troponin, BNP, coags, [place], CBC      11:43 AM I met with the patient, obtained history, performed an initial exam, and discussed options and plan for diagnostics and treatment here in the ED.   3:27 PM I spoke with Dr. Worrell about the patient.    Medical Decision Making  Obtained supplemental history:Supplemental history obtained?: No  Reviewed external records: External records reviewed?: Documented in chart  Care impacted by chronic illness:Chronic Kidney Disease, Diabetes, and Hyperlipidemia  Care significantly affected by social determinants of health:N/A  Did you consider but not order tests?: Work up considered but not performed and documented in chart, if applicable  Did you interpret images independently?: Independent interpretation of ECG and images noted in documentation, when applicable.  Consultation discussion with other provider:Did you involve another provider (consultant, , pharmacy, etc.)?: I discussed the care with another health care provider, see documentation for details.  Admit.    PE: The patient had an abnormal d-dimer.  Therefore CTA PE ordered      At the conclusion of the encounter I discussed the results of all of the tests and the disposition. The questions were answered. The patient or family acknowledged understanding and was agreeable with the care plan.         MEDICATIONS GIVEN IN THE EMERGENCY:  Medications   iopamidol (ISOVUE-370) solution 75 mL (75 mLs Intravenous $Given 8/7/24 4655)       NEW PRESCRIPTIONS STARTED AT TODAY'S ER VISIT  New Prescriptions    No medications on file          =================================================================    HPI    Patient information was obtained from: patient    Use of : N/A      Harpreet Tovar is a 78 year old male with a pertinent  history of diabetes, hyperlipidemia, and chronic kidney disease who presents to this ED by ambulance for evaluation of syncope.    Per patient, he had walked four block to his [facility]. Upon leaving he fell on the curb of the parking lot and landed face first into the grass. He thinks he may have passed out but he was not 100% certain exactly what happened. Upon meeting with him today (08/07/2024) he noted feeling lightheaded/tired. Also he noted that this morning he awoke with left arm pain which felt similar to when he had a past heart attack. After the fall his left shoulder was a bit sore but he was not fully sure if he had landed on the arm.    He has had no chest pain.    It was noted that he does smoke cigarettes. Also his left leg has some neuropathy and some surgical scars from a past bypass surgery. Along with bypass surgery he has also had back surgery. In the past [facility] had [facility] (encounter not shown in chart review). He has no neurological deficits since his strokes but he has been on Asprin ever since they happened. Besides Asprin he was unable to list any specific medications but he did note that he has a nurse that comes every few weeks and refills his pills.    Chart review:  Per Chart Review, the patient was seen from 11/10/2022-11/11/2022 at [facility] for evaluation of syncope. Prior to his admission he had 3-4 episodes of syncope. His initial labs, CT scan, and hemodynamics were unremarkable. His ECG was not concerning for ACS or arrhythmia. Troponin, BNP, echocardiogram, and TSH were normal and his COVID test was negative. His prolactin was only mildly elevated which likely did not indicate a seizure. Upon his discharge he was medically stable and planned to follow up with his primary care provider.      REVIEW OF SYSTEMS   Review of Systems     No fever    PAST MEDICAL HISTORY:  Past Medical History:  "  Diagnosis Date    CAD (coronary artery disease)     CKD (chronic kidney disease) stage 3, GFR 30-59 ml/min (H) 11/10/2022    Diabetes mellitus type 2 without retinopathy (H) 11/10/2022    History of coronary artery bypass graft 11/10/2022    Recurrent major depressive disorder, in partial remission (H) 11/10/2022       PAST SURGICAL HISTORY:  Past Surgical History:   Procedure Laterality Date    history of CABG             CURRENT MEDICATIONS:    albuterol (PROAIR HFA/PROVENTIL HFA/VENTOLIN HFA) 108 (90 Base) MCG/ACT inhaler  aspirin (ASA) 325 MG tablet  fluticasone (FLONASE) 50 MCG/ACT nasal spray  gabapentin (NEURONTIN) 100 MG capsule  metFORMIN (GLUCOPHAGE) 500 MG tablet        ALLERGIES:  Allergies   Allergen Reactions    Tylenol [Acetaminophen] Anxiety and Dizziness     It happened a couple year of ago       FAMILY HISTORY:  Family History   Problem Relation Age of Onset    Dementia Mother     Pancreatitis Father        SOCIAL HISTORY:   Social History     Socioeconomic History    Marital status:        VITALS:  BP (!) 158/77   Pulse 56   Temp 97.6  F (36.4  C) (Oral)   Resp 19   Ht 1.727 m (5' 8\")   Wt 77.1 kg (170 lb)   SpO2 96%   BMI 25.85 kg/m      PHYSICAL EXAM      Vitals: BP (!) 158/77   Pulse 56   Temp 97.6  F (36.4  C) (Oral)   Resp 19   Ht 1.727 m (5' 8\")   Wt 77.1 kg (170 lb)   SpO2 96%   BMI 25.85 kg/m    BP (!) 158/77   Pulse 56   Temp 97.6  F (36.4  C) (Oral)   Resp 19   Ht 1.727 m (5' 8\")   Wt 77.1 kg (170 lb)   SpO2 96%   BMI 25.85 kg/m      General Appearance: Alert, cooperative, normal speech and facial symmetry,  appears stated age    Primary survey:     Airway patent  Breath sounds: bilateral breath sounds  Cardiovascular: 2+ radial pulses and DP pulses  Disability GCS 15    Secondary survey    Head:  Normocephalic, without obvious abnormality, atraumatic  Eyes:  PERRL,pupils midsized, conjunctiva/corneas clear, EOM's intact, no orbital injury  ENT:   " Extraocular movements are intact.  No evidence of orbital injury.  Swollen nose, dried blood at nares.  No visible septal hematoma  Neck:  No midline cervical spine tenderness.  No paraspinal tenderness.  Chest:  No tenderness or deformity, no crepitus  Cardio:  Regular rate and rhythm, S1 and S2 paul    or gallop, 2+ pulses symmetric in all extremities  Pulm:  Clear to auscultation bilaterally, respirations unlabored,   Back:   no CVA tenderness, no spinal tenderness  Abdomen:  Soft, non-tender, no rebound or guarding, no pelvic pain to compression  Extremities:  No obvious deformity, all joints palpated in place with full range of motion.  No tenderness or instability.  Patient is able to bear full weight, no tenderness over joints or extremities, no cyanosis or edema, full function and range of motion, pulses equal in all extremities, normal cap refill  Skin:  Skin color, texture, turgor normal, no rashes or lesions. Slight abrasion to forehead.  Neuro:  Awake, alert, responsive to voice, follows commands, normal speech, No gross motor weakness or sensory loss, moves all extremities, GCS 15     LAB:  All pertinent labs reviewed and interpreted.  Results for orders placed or performed during the hospital encounter of 08/07/24   CT Chest Pulmonary Embolism w Contrast    Impression    IMPRESSION:    1.  No pulmonary embolism.    2.  Increased size of a left upper lobe pulmonary nodule since 2022. Additional 8 x 12 mm left lower lobe nodule for which exact stability cannot be determined secondary to motion artifact on the prior, though may also be larger. Given increased size,   recommend PET/CT for neoplasm evaluation.    3.  No other acute findings to explain symptoms.    4.  Mild pulmonary emphysema.     Head CT w/o contrast    Impression    IMPRESSION:  1.  No CT evidence for acute intracranial process.  2.  Brain atrophy and presumed chronic microvascular ischemic changes as above.   CT Cervical Spine w/o  Contrast    Impression    IMPRESSION:  1.  No fracture or posttraumatic subluxation.  2.  No high-grade spinal canal or neural foraminal stenosis.   Extra Blue Top Tube   Result Value Ref Range    Hold Specimen JIC    Extra Red Top Tube   Result Value Ref Range    Hold Specimen JIC    Extra Green Top (Lithium Heparin) Tube   Result Value Ref Range    Hold Specimen JIC    Extra Purple Top Tube   Result Value Ref Range    Hold Specimen JIC    Basic metabolic panel   Result Value Ref Range    Sodium 141 135 - 145 mmol/L    Potassium 4.3 3.4 - 5.3 mmol/L    Chloride 106 98 - 107 mmol/L    Carbon Dioxide (CO2) 22 22 - 29 mmol/L    Anion Gap 13 7 - 15 mmol/L    Urea Nitrogen 18.9 8.0 - 23.0 mg/dL    Creatinine 1.28 (H) 0.67 - 1.17 mg/dL    GFR Estimate 57 (L) >60 mL/min/1.73m2    Calcium 8.9 8.8 - 10.4 mg/dL    Glucose 110 (H) 70 - 99 mg/dL   Result Value Ref Range    Magnesium 1.8 1.7 - 2.3 mg/dL   Protime-INR   Result Value Ref Range    INR 1.12 0.85 - 1.15   D-dimer, Quantitative   Result Value Ref Range    D-Dimer Quantitative 0.82 (H) 0.00 - 0.50 ug/mL FEU   Result Value Ref Range    Troponin T, High Sensitivity 17 <=22 ng/L   CBC with platelets and differential   Result Value Ref Range    WBC Count 7.5 4.0 - 11.0 10e3/uL    RBC Count 4.34 (L) 4.40 - 5.90 10e6/uL    Hemoglobin 13.0 (L) 13.3 - 17.7 g/dL    Hematocrit 38.6 (L) 40.0 - 53.0 %    MCV 89 78 - 100 fL    MCH 30.0 26.5 - 33.0 pg    MCHC 33.7 31.5 - 36.5 g/dL    RDW 14.4 10.0 - 15.0 %    Platelet Count 202 150 - 450 10e3/uL    % Neutrophils 60 %    % Lymphocytes 27 %    % Monocytes 10 %    % Eosinophils 2 %    % Basophils 1 %    % Immature Granulocytes 0 %    NRBCs per 100 WBC 0 <1 /100    Absolute Neutrophils 4.5 1.6 - 8.3 10e3/uL    Absolute Lymphocytes 2.0 0.8 - 5.3 10e3/uL    Absolute Monocytes 0.7 0.0 - 1.3 10e3/uL    Absolute Eosinophils 0.2 0.0 - 0.7 10e3/uL    Absolute Basophils 0.1 0.0 - 0.2 10e3/uL    Absolute Immature Granulocytes 0.0 <=0.4 10e3/uL     Absolute NRBCs 0.0 10e3/uL       RADIOLOGY:  Reviewed all pertinent imaging. Please see official radiology report.  CT Chest Pulmonary Embolism w Contrast   Final Result   IMPRESSION:      1.  No pulmonary embolism.      2.  Increased size of a left upper lobe pulmonary nodule since 2022. Additional 8 x 12 mm left lower lobe nodule for which exact stability cannot be determined secondary to motion artifact on the prior, though may also be larger. Given increased size,    recommend PET/CT for neoplasm evaluation.      3.  No other acute findings to explain symptoms.      4.  Mild pulmonary emphysema.         CT Cervical Spine w/o Contrast   Final Result   IMPRESSION:   1.  No fracture or posttraumatic subluxation.   2.  No high-grade spinal canal or neural foraminal stenosis.      Head CT w/o contrast   Final Result   IMPRESSION:   1.  No CT evidence for acute intracranial process.   2.  Brain atrophy and presumed chronic microvascular ischemic changes as above.          EKG:    Performed at: 08/07/2024 at 11:16 AM    Impression: Sinus rhythm with 1st degree A-V block with frequent premature ventricular complexes, right bundle branch block    Rate: 63 BPM  Rhythm: Sinus  Axis: 75  NM Interval: 276 ms  QRS Interval: 166 ms  QTc Interval: 466 ms  ST Changes: No ST change  Comparison: When compared with ECG from 10/11/2024 at 11:47 AM premature ventricular complexes are now present    I have independently reviewed and interpreted the EKG(s) documented above.            I, Harry Gilbert, am serving as a scribe to document services personally performed by Nakul Gustafson MD based on my observation and the provider's statements to me. I, Nakul Gustafson MD, attest that Harry Gilbert is acting in a scribe capacity, has observed my performance of the services and has documented them in accordance with my direction.    Nakul Gustafson MD  [facility] EMERGENCY DEPARTMENT  [address]  [id number]  [phone]       Nakul Gustafson MD  08/07/24 [id number]

## 2024-08-07 NOTE — MEDICATION SCRIBE - ADMISSION MEDICATION HISTORY
Medication Scribe Admission Medication History    Admission medication history is complete. The information provided in this note is only as accurate as the sources available at the time of the update.    Information Source(s): Patient and CareEverywhere/SureScripts via in-person    Pertinent Information: pt was a poor historian during interview wasn't able to clarify most med's on PTA list  Pt has a home health nurse who helps set up med's however pt wasn't able to provide nurses contact info for a med list  Asked pt is theres anyone at home who could bring in his med's or go thru them over the phone, pt states no one at his home currently  Most med's are marked unknown  [facility] all am med's today    Changes made to PTA medication list:  Added: None  Deleted: None  Changed: None    Allergies reviewed with patient and updates made in EHR: yes    Medication History Completed By: LUCIA POZO 8/7/2024 5:00 PM    PTA Med List   Medication Sig Last Dose    albuterol (PROAIR HFA/PROVENTIL HFA/VENTOLIN HFA) 108 (90 Base) MCG/ACT inhaler Inhale 2 puffs into the lungs every 4 hours as needed Past Month at prn    aspirin 81 MG EC tablet Take 81 mg by mouth daily 8/7/2024 at am    atorvastatin (LIPITOR) 40 MG tablet Take 40 mg by mouth daily Unknown    diclofenac (VOLTAREN) 1 % topical gel Apply topically 4 times daily as needed Unknown at prn    fluticasone (FLONASE) 50 MCG/ACT nasal spray Spray 1 spray into both nostrils daily as needed Unknown at prn    gabapentin (NEURONTIN) 100 MG capsule Take by mouth See Admin Instructions 100mg in the morning   200mg in the evening 8/7/2024 at am    ipratropium (ATROVENT) 0.06 % nasal spray Spray 2 sprays into both nostrils 4 times daily as needed Past Week at prn    lidocaine (LIDODERM) 5 % patch Apply topically 2 times daily as needed At bedtime for lower back pain Past Month at per pt using prn    lisinopril (ZESTRIL) 10 MG tablet Take 10 mg by  mouth daily 8/7/2024 at am    metFORMIN (GLUCOPHAGE) 500 MG tablet Take 1,000 mg by mouth daily 8/7/2024 at am    omeprazole (PRILOSEC) 20 MG DR capsule Take 20 mg by mouth daily 8/7/2024 at am    oxyBUTYnin (DITROPAN) 5 MG tablet Take 5 mg by mouth 2 times daily Unknown    tiotropium-olodaterol 2.5-2.5 MCG/ACT AERS Inhale 2 puffs into the lungs daily Unknown    vitamin B-12 (CYANOCOBALAMIN) 1000 MCG tablet Take 1,000 mcg by mouth daily 8/7/2024 at am

## 2024-08-07 NOTE — ED NOTES
Bed: [place] [address]  Expected date:   Expected time:   Means of arrival: Ambulance  Comments:  [place]: Syncope x 2, fall, and 2nd degree block

## 2024-08-07 NOTE — ED TRIAGE NOTES
Pt arrives via [facility] from home. Pt had a syncopal episode and fell and hit his head while he was out on a walk. Bystanders found him and called EMS. Pt had another syncopal episode with EMS. Initial BP for EMS was 80s/50s and improved with 400ml of fluids to 110/78. Hx of 6 cardiac stents placed in 2021. Pt reports headache, abrasions noted to bridge of nose. Denies blood thinner use. .

## 2024-08-07 NOTE — ED NOTES
"[facility] ED Handoff Report    ED Chief Complaint: syncope    ED Diagnosis:  (R55) Syncope, unspecified syncope type    (I49.3) Frequent PVCs    (J43.9) Pulmonary emphysema, unspecified emphysema type (H)    (R91.8) Lung mass  Comment: Recommends lung CT or PET scan to evaluate for malignancy    (R00.1) Sinus bradycardia       PMH:    Past Medical History:   Diagnosis Date    CAD (coronary artery disease)     CKD (chronic kidney disease) stage 3, GFR 30-59 ml/min (H) 11/10/2022    Diabetes mellitus type 2 without retinopathy (H) 11/10/2022    History of coronary artery bypass graft 11/10/2022    Recurrent major depressive disorder, in partial remission (H) 11/10/2022        Code Status:  Prior     Falls Risk: Yes Band: Applied    Current Living Situation/Residence: lives in a house     Elimination Status: Continent: Yes     Activity Level: SBA    Patients Preferred Language:  English     Needed: No    Vital Signs:  BP (!) 167/84   Pulse 58   Temp 97.6  F (36.4  C) (Oral)   Resp 25   Ht 1.727 m (5' 8\")   Wt 77.1 kg (170 lb)   SpO2 98%   BMI 25.85 kg/m       Cardiac Rhythm: SB    Pain Score: 2/10    Is the Patient Confused:  No    Last Food or Drink: 08/07/24     Focused Assessment:  Pt arrives via [facility] from home. Pt had a syncopal episode and fell and hit his head while he was out on a walk. Bystanders found him and called EMS. Pt had another syncopal episode with EMS. Initial BP for EMS was 80s/50s and improved with 400ml of fluids to 110/78. Hx of 6 cardiac stents placed in 2021. Pt reports headache, abrasions noted to bridge of nose. Denies blood thinner use. .     Tests Performed: Done: Labs and Imaging    Treatments Provided:  see MAR    Family Dynamics/Concerns: No    Family Updated On Visitor Policy: No    Plan of Care Communicated to Family: No    Who Was Updated about Plan of Care: per patient    Belongings Checklist Done and Signed by Patient: Yes    Medications sent " with patient: none    Covid: asymptomatic    Additional Information: none    RN: Carmen Gonzales RN 8/7/2024 4:08 PM

## 2024-08-08 NOTE — DISCHARGE SUMMARY
[facility]  Discharge Summary - Medicine & Pediatrics       Date of Admission:  8/7/2024  Date of Discharge:  8/8/2024  Discharging Provider: Dr. Henry  Discharge Service: Hospitalist Service    Discharge Diagnoses   Frequent PVCs  Syncope  First-degree AV block   Sinus bradycardia  Type 2 Diabetes  CKD stage III  HTN  HLD  Peripheral neuropathy   BPH  GERD  Prior CVA  CAD s/p CABG      Clinically Significant Risk Factors          Follow-ups Needed After Discharge   Follow-up Appointments     Follow-up and recommended labs and tests       Follow up with primary care provider, [facility], within 7 days for hospital follow- up.  The following labs/tests   are recommended: Consider repeat Holter monitor.            Unresulted Labs Ordered in the Past 30 Days of this Admission       No orders found for last 31 day(s).            Discharge Disposition   Discharged to home  Condition at discharge: Stable    Hospital Course   Harpreet Tovar is a 78 year old male admitted on 8/7/2024. He has a history of type 2 diabetes, CKD, hyperlipidemia, CAD s/p CABG, CVA, possible lung cancer and is admitted for syncope.     Syncope  Fall  Patient with fall and reported syncope outside of his senior living facility.  Reports no prodromal symptoms prior to the fall and recalls most events afterward.  Per EMS report, he was initially hypotensive and had another episode of syncope/near syncope while with them.  Given 400 mL of fluid by EMS.  He was hospitalized here at 2022 for syncopal episodes with no cause identified. [facility] and having a 2-week Holter monitor.  Denies chest pain, shortness of breath, or significant palpitations.  Troponin negative.  D-dimer mildly elevated with CT PE showing no pulmonary embolism. CT head and cervical spine unremarkable. EKG revealing sinus bradycardia with first-degree AV  block, frequent PVCs, right bundle branch block.  Differential includes arrhythmia, vasovagal, orthostatic hypotension, mechanical fall, seizure, stroke.  Seems likely this is related to arrhythmia with his first-degree AV block, bradycardia and frequent PVCs although no extended pauses on telemetry that would lead to syncope. Possible orthostatic with the hypotension reported by EMS although orthostatic vitals inpatient are unsupportive. Although he reports not tripping, he does have peripheral neuropathy and ambulates with a cane, so must question if there could be some mechanical contribution as well. Patient is otherwise at his baseline and medically ready for discharge at this time. No additional treatment indicated.   - advise ambulation with compression stockings  - continue home PT upon discharge     First-degree AV block  Sinus bradycardia  Frequent PVCs  Noted to be bradycardic on arrival, EKG showing sinus bradycardia with first-degree AV block, frequent PVCs, and a right bundle branch block.  Prior EKGs on file from 2022 showing first-degree AV block and right bundle but normal rate and no PVCs.  Rate appears to be stable in the 50s to 60s.  He does not appear to be on any beta-blockers and would be hesitant to start given bradycardia.  - [facility]     History of CVA  Patient with reported stroke in January of this year.  No residual neurologic deficits.  [facility]  Appears to be taking aspirin but no evidence of him taking clopidogrel.  -continue home baby aspirin     CAD s/p CABG  Remote history of CABG with reported 6 vessel bypass 24 years ago.  EKG performed in the ED with no ST changes.  Troponin negative.  Patient denies current symptoms of chest pain.     Type 2 diabetes c/b peripheral neuropathy   History of type 2 diabetes but very well-controlled. Hemoglobin A1c 5.7% here on admission.  Only taking metformin for diabetic control prior to  admission.  - continue home metformin  - continue home gabapentin     Lung nodule  Possible lung cancer  Small left lower lobe nodule noted on CT scan here.  Patient reports this was identified approximately 8 months [facility] and that it is lung cancer.  Not currently receiving any cancer therapy.     Emphysema  Current smoker  Currently smokes about half pack per day.  Mild emphysema noted on CT scan.  Patient reports having inhalers at home but never using them.  Satting well on room air with minimal expiratory wheezes on exam.  - continue home albuterol as needed      CKD stage III  Creatinine of 1.28 here on admission, which appears to be his baseline.     Chronic Problems  BPH - continue home oxybutynin  HTN - continue home lisinopril  GERD - continue home pantoprazole  Hyperlipidemia - continue home atorvastatin    Consultations This Hospital Stay   PHYSICAL THERAPY ADULT IP CONSULT  OCCUPATIONAL THERAPY ADULT IP CONSULT  CARE MANAGEMENT / SOCIAL WORK IP CONSULT    Code Status   No CPR- Do NOT Intubate       The patient was discussed with Dr. Elaine Portillo, MS4  [facility]  [address] [place] [id number]  Phone: [phone]  Fax: [phone]    I was present with the medical student who participated in the service and in the documentation of this note. I have verified the history and personally performed the physical exam and medical decision making, and have verified the content of the note, which accurately reflects my assessment of the patient and the plan of care  Sanchez Worrell MD, PGY-1  [facility]     ______________________________________________________________________    Physical Exam   Vital Signs: Temp: 97.5  F (36.4  C) Temp src: Oral BP: 136/73 Pulse: 62   Resp: 18 SpO2: 96 % O2 Device: None (Room air)    Weight: 165 lbs 4.8 oz    Constitutional: awake, alert, cooperative, no apparent  distress, and appears stated age  HEENT: Normocephalic. Small, well-healing abrasions on forehead, nose, and lip.   Respiratory: No increased work of breathing, good air exchange, clear to auscultation bilaterally, no crackles or wheezing  Cardiovascular: Bradycardic with frequent extra beats, no murmurs   GI: No scars, normal bowel sounds, soft, non-distended, non-tender, no masses palpated, no hepatosplenomegally  Extremities: No lower extremity edema      Primary Care Physician   [facility]    Discharge Orders      Reason for your hospital stay    You were seen in the hospital after a fall. Your blood pressure was initially a little low and your heart rate was slow with many extra beats. We monitored your heart overnight to make sure there were not any pauses or irregular rhythms that could have caused you to lose consciousness and did not see any.     Follow-up and recommended labs and tests     Follow up with primary care provider, [facility], within 7 days for hospital follow- up.  The following labs/tests are recommended: Consider repeat Holter monitor.     Activity    Your activity upon discharge: activity as tolerated     Resume Home Care Services     Diet    Follow this diet upon discharge: Orders Placed This Encounter      Low Saturated Fat Na <2400 mg       Significant Results and Procedures   Most Recent 3 CBC's:  Recent Labs   Lab Test 08/08/24  0451 08/07/24  1129 11/11/22  0542 11/10/22  1201   WBC 10.9 7.5  --  7.0   HGB 12.7* 13.0* 14.5 13.2*   MCV 90 89  --  89    202  --  263     Most Recent 3 BMP's:  Recent Labs   Lab Test 08/08/24  1121 08/08/24  0722 08/08/24  0451 08/07/24  1732 08/07/24  1129 11/10/22  1741 11/10/22  1201   NA  --   --  139  --  141  --  138   POTASSIUM  --   --  4.1  --  4.3  --  3.8   CHLORIDE  --   --  103  --  106  --  107   CO2  --   --  26  --  22  --  19*   BUN  --   --  18.2  --  18.9  --  16.5   CR  --    --  1.33*  --  1.28*  --  1.28*   ANIONGAP  --   --  10  --  13  --  12   DONNA  --   --  8.8  --  8.9  --  8.0*   GLC 80 84 82   < > 110*   < > 107*    < > = values in this interval not displayed.     Most Recent 3 Troponin's:No lab results found.  Most Recent Hemoglobin A1c:  Recent Labs   Lab Test 08/07/24  1129   A1C 5.7*   ,   Results for orders placed or performed during the hospital encounter of 08/07/24   CT Chest Pulmonary Embolism w Contrast    Narrative    EXAM: CT CHEST PULMONARY EMBOLISM W CONTRAST  LOCATION: [facility]  DATE: 8/7/2024    INDICATION: Syncope. Elevated d-dimer.  COMPARISON: 5/20/2022.  TECHNIQUE: CT chest pulmonary angiogram during arterial phase injection of IV contrast. Multiplanar reformats and MIP reconstructions were performed. Dose reduction techniques were used.   CONTRAST: isovue 370 75ml    FINDINGS:  ANGIOGRAM CHEST: No pulmonary embolism. Nonaneurysmal aorta. No ascending aortic dissection, though suboptimal opacification of the remaining aorta for dissection evaluation. Mild atherosclerosis.    LUNGS AND PLEURA: Mild emphysema. Minimal atelectasis. Left lower lobe superior segment 8 x 12 mm nodule; this was present on the May 2022 exam, though motion artifact on that exam compromises exact stability. On that exam it measured roughly 7-8 mm.   Increased soft tissue along a cystic or cavitary focus in the left upper lobe with example area measuring 6 x 17 mm versus 3 x 8 mm previously (image 117). Mild central airway thickening. No pleural effusion or pneumothorax.    MEDIASTINUM/AXILLAE: No adenopathy. Normal heart size. No pericardial effusion.    CORONARY ARTERY CALCIFICATION: Sternotomy and CABG.    UPPER ABDOMEN: Nothing acute.    MUSCULOSKELETAL: Subacute in appearance with mild callus, though a fracture lucency remains, of a nondisplaced posterior 11th rib fracture.      Impression    IMPRESSION:    1.  No pulmonary embolism.    2.  Increased size  of a left upper lobe pulmonary nodule since 2022. Additional 8 x 12 mm left lower lobe nodule for which exact stability cannot be determined secondary to motion artifact on the prior, though may also be larger. Given increased size,   recommend PET/CT for neoplasm evaluation.    3.  No other acute findings to explain symptoms.    4.  Mild pulmonary emphysema.     Head CT w/o contrast    Narrative    EXAM: CT HEAD W/O CONTRAST  LOCATION: [facility]  DATE: 8/7/2024    INDICATION: syncope, abrasion to nose, forehead  COMPARISON: 11/10/2022.  TECHNIQUE: Routine CT Head without IV contrast. Multiplanar reformats. Dose reduction techniques were used.    FINDINGS:  INTRACRANIAL CONTENTS: No intracranial hemorrhage, extraaxial collection, or mass effect.  No CT evidence of acute infarct. Chronic right frontoparietal encephalomalacia. Moderate presumed chronic small vessel ischemic changes. Mild generalized volume   loss. No hydrocephalus. Bilateral carotid siphon and vertebral artery V4 segment atherosclerotic calcifications.    VISUALIZED ORBITS/SINUSES/MASTOIDS: No intraorbital abnormality. No paranasal sinus mucosal disease. Scattered fluid/membrane thickening in the mastoid air cells bilaterally.    BONES/SOFT TISSUES: No acute abnormality.      Impression    IMPRESSION:  1.  No CT evidence for acute intracranial process.  2.  Brain atrophy and presumed chronic microvascular ischemic changes as above.   CT Cervical Spine w/o Contrast    Narrative    EXAM: CT CERVICAL SPINE W/O CONTRAST  LOCATION: [facility]  DATE: 8/7/2024    INDICATION: syncope. head injury  COMPARISON: None.  TECHNIQUE: Routine CT Cervical Spine without IV contrast. Multiplanar reformats. Dose reduction techniques were used.    FINDINGS:  VERTEBRA: Normal vertebral body heights and alignment. No fracture or posttraumatic subluxation.     CANAL/FORAMINA: Mild multilevel cervical spondylosis, greatest at  C4-C5 and C5-C6. No high-grade canal or neural foraminal stenosis.    PARASPINAL: Bilateral carotid bulb atherosclerotic calcifications.      Impression    IMPRESSION:  1.  No fracture or posttraumatic subluxation.  2.  No high-grade spinal canal or neural foraminal stenosis.       Discharge Medications   Discharge Medication List as of 8/8/2024 11:42 AM        CONTINUE these medications which have NOT CHANGED    Details   albuterol (PROAIR HFA/PROVENTIL HFA/VENTOLIN HFA) 108 (90 Base) MCG/ACT inhaler Inhale 2 puffs into the lungs every 4 hours as needed, Historical      aspirin 81 MG EC tablet Take 81 mg by mouth daily, Historical      atorvastatin (LIPITOR) 40 MG tablet Take 40 mg by mouth daily, Historical      diclofenac (VOLTAREN) 1 % topical gel Apply topically 4 times daily as needed, Historical      fluticasone (FLONASE) 50 MCG/ACT nasal spray Spray 1 spray into both nostrils daily as needed, Historical      gabapentin (NEURONTIN) 100 MG capsule Take by mouth See Admin Instructions 100mg in the morning   200mg in the evening, Historical      ipratropium (ATROVENT) 0.06 % nasal spray Spray 2 sprays into both nostrils 4 times daily as needed, Historical      lidocaine (LIDODERM) 5 % patch Apply topically 2 times daily as needed At bedtime for lower back painHistorical      lisinopril (ZESTRIL) 10 MG tablet Take 10 mg by mouth daily, Historical      metFORMIN (GLUCOPHAGE) 500 MG tablet Take 1,000 mg by mouth daily, Historical      omeprazole (PRILOSEC) 20 MG DR capsule Take 20 mg by mouth daily, Historical      oxyBUTYnin (DITROPAN) 5 MG tablet Take 5 mg by mouth 2 times daily, Historical      tiotropium-olodaterol 2.5-2.5 MCG/ACT AERS Inhale 2 puffs into the lungs daily, Historical      vitamin B-12 (CYANOCOBALAMIN) 1000 MCG tablet Take 1,000 mcg by mouth daily, Historical           Allergies   Allergies   Allergen Reactions    Tylenol [Acetaminophen] Anxiety and Dizziness     It happened a couple year of ago

## 2024-08-08 NOTE — PLAN OF CARE
Occupational Therapy Discharge Summary    Reason for therapy discharge:    All goals and outcomes met, no further needs identified.    Progress towards therapy goal(s). See goals on Care Plan in [facility] electronic health record for goal details.  Goals met    Therapy recommendation(s):    No further therapy is recommended.    Anali Albright, OTR/L 8/8/24

## 2024-08-08 NOTE — PROGRESS NOTES
"   08/08/24 0740   Appointment Info   Signing Clinician's Name / Credentials (OT) Anali Jose Ramon, OTR/L   Living Environment   People in Home alone   Current Living Arrangements apartment  (senior living)   Home Accessibility no concerns   Living Environment Comments Tub shower with shower chair and grab bars.   Self-Care   Equipment Currently Used at Home cane, straight   Fall history within last six months yes   Number of times patient has fallen within last six months 1   Activity/Exercise/Self-Care Comment Independent with ADLs.   Instrumental Activities of Daily Living (IADL)   IADL Comments Pt has caregivers come 2x/week to assist with laundry, meal prep, etc., does not drive - takes bus.   General Information   Onset of Illness/Injury or Date of Surgery 08/07/24   Referring Physician Sanchez Worrell MD   Patient/Family Therapy Goal Statement (OT) to go home   Additional Occupational Profile Info/Pertinent History of Current Problem Per chart review, pt \"is a 78 year old male admitted on 8/7/2024. He has a history of type 2 diabetes, CKD, hyperlipidemia, CAD s/p CABG, CVA, possible lung cancer and is admitted for syncope.\"   Existing Precautions/Restrictions fall   Cognitive Status Examination   Orientation Status orientation to person, place and time  (A&Ox4)   Pain Assessment   Patient Currently in Pain No   Range of Motion Comprehensive   General Range of Motion no range of motion deficits identified   Strength Comprehensive (MMT)   General Manual Muscle Testing (MMT) Assessment no strength deficits identified   Bed Mobility   Bed Mobility supine-sit;sit-supine   Supine-Sit [place] (Bed Mobility) independent   Sit-Supine [place] (Bed Mobility) independent   Transfers   Transfers sit-stand transfer   Sit-Stand Transfer   Sit-Stand [place] (Transfers) contact guard   Sit/Stand Transfer Comments unsteady initially   Balance   Balance Comments Initially unsteady requiring Min A/CGA for balance in " standing, progressed to close SBA with use of SEC. Pt noted to be reaching for furniture to aid balance.   Activities of Daily Living   BADL Assessment/Intervention grooming   Grooming Assessment/Training   Position (Grooming) unsupported standing   [place] (Grooming) supervision   Comment, (Grooming) fatigued quickly   Clinical Impression   Criteria for Skilled Therapeutic Interventions Met (OT) Yes, treatment indicated   OT Diagnosis Impaired functional endurance needed for ADLs, IADLs, and functional mobility.   Influenced by the following impairments s/p syncope   OT Problem List-Impairments impacting ADL problems related to;activity tolerance impaired;balance   Assessment of Occupational Performance 1-3 Performance Deficits   Identified Performance Deficits functional endurance   Planned Therapy Interventions (OT) ADL retraining;IADL retraining;home program guidelines;progressive activity/exercise;risk factor education   Clinical Decision Making Complexity (OT) problem focused assessment/low complexity   Risk & Benefits of therapy have been explained evaluation/treatment results reviewed;care plan/treatment goals reviewed;risks/benefits reviewed;current/potential barriers reviewed;participants voiced agreement with care plan;participants included;patient   OT Total Evaluation Time   OT Eval, Low Complexity Minutes (88982) 12   OT Goals   Therapy Frequency (OT) One time eval and treatment   OT Predicted Duration/Target Date for Goal Attainment 08/15/24   OT Goals Aerobic Activity;OT Goal 1   OT: Perform aerobic activity with stable cardiovascular response continuous activity;10 minutes;Completed   OT: Goal 1 Pt will verbalize understanding of energy conservation techniques.   Self-Care/Home Management   Self-Care/Home Mgmt/ADL, Compensatory, Meal Prep Minutes (96112) 8   Treatment Detail/Skilled Intervention Provided cueing for rest breaks between position changes, pt verbalized understanding and demo  good carryover between supine>EOB>standing. Pt stood at sink to complete G/H for >5 min with SBA, fatigued quickly and requested to complete additional tasks later. Pt's tele monitor occasionally reading frequent PVCs during session, pt asymptomatic. Pt left in bed at end of session with bed alarm on and call light in reach.   OT Discharge Planning   OT Plan discharge OT   OT Discharge Recommendation (DC Rec) home   OT Rationale for DC Rec Pt moving close to baseline, no concerns with pt performing ADLs.   OT Brief overview of current status SBA functional mobility, SBA G/H, independent bed mobility   Total Session Time   Timed Code Treatment Minutes 8   Total Session Time (sum of timed and untimed services) 20

## 2024-08-08 NOTE — CONSULTS
Care Management Initial Consult    General Information  Assessment completed with: Patient,    Type of CM/SW Visit: Initial Assessment    Primary Care Provider verified and updated as needed: Yes   Readmission within the last 30 days: no previous admission in last 30 days      Reason for Consult: discharge planning  Advance Care Planning: Advance Care Planning Reviewed: present on chart          Communication Assessment  Patient's communication style: spoken language (English or Bilingual)    Hearing Difficulty or Deaf: no   Wear Glasses or Blind: yes    Cognitive  Cognitive/Neuro/Behavioral: WDL                      Living Environment:   People in home: alone     Current living Arrangements: house      Able to return to prior arrangements: yes       Family/Social Support:  Care provided by: self  Provides care for: no one  Marital Status:              Description of Support System: Supportive         Current Resources:   Patient receiving home care services: No     Community Resources: None  Equipment currently used at home: cane, straight  Supplies currently used at home: None    Employment/Financial:  Employment Status: retired, , previous service        Financial Concerns: none   Referral to Financial Worker: No       Does the patient's insurance plan have a 3 day qualifying hospital stay waiver?  No    Lifestyle & Psychosocial Needs:  Social Determinants of Health     Food Insecurity: Not on file   Depression: Not on file   Housing Stability: Not on file   Tobacco Use: High Risk (7/28/2019)    Received from [employer]    Patient History     Smoking Tobacco Use: Every Day     Smokeless Tobacco Use: Never     Passive Exposure: Not on file   Financial Resource Strain: Not on file   Alcohol Use: Not on file   Transportation Needs: Not on file   Physical Activity: Not on file   Interpersonal Safety: Not on file   Stress: Not on file   Social Connections: Not on file   Health Literacy: Not on file        Functional Status:  Prior to admission patient needed assistance:   Dependent ADLs:: Independent, Ambulation-cane  Dependent IADLs:: Independent       Mental Health Status:  Mental Health Status: No Current Concerns       Chemical Dependency Status:  Chemical Dependency Status: No Current Concerns             Values/Beliefs:  Spiritual, Cultural Beliefs, Anglican Practices, Values that affect care:                 Additional Information:  SW met with pt to introduce role of CM, complete  initial assessment, and to discuss needs at time of d/c. Pt comes from home; lives alone and noted to be independent at baseline. Therapy feels like pt is at baseline; recommending home. Pt feels that there will be no needs from CM at discharge, and will follow with PCP if needs arise post discharge. [facility].    CM to continue to follow through hospitalization.  11:11 AM    Brenna Kjellberg, BSW LSW  8/8/2024

## 2024-08-08 NOTE — PLAN OF CARE
Problem: Adult Inpatient Plan of Care  Goal: Plan of Care Review  Description: The Plan of Care Review/Shift note should be completed every shift.  The Outcome Evaluation is a brief statement about your assessment that the patient is improving, declining, or no change.  This information will be displayed automatically on your shift  note.  8/7/2024 2245 by Klaudia Martinez RN  Outcome: Progressing  8/7/2024 2244 by Klaudia Martinez RN  Outcome: Progressing     Problem: Risk for Delirium  Goal: Optimal Coping  Outcome: Progressing  Goal: Improved Behavioral Control  Outcome: Progressing  Intervention: Minimize Safety Risk  Recent Flowsheet Documentation  Taken 8/7/2024 2122 by Klaudia Martinez RN  Enhanced Safety Measures: review medications for side effects with activity  Taken 8/7/2024 1650 by Klaudia Martinez RN  Enhanced Safety Measures: review medications for side effects with activity     Problem: Syncope  Goal: Absence of Syncopal Symptoms  Outcome: Progressing  Intervention: Manage Effect of Syncopal Symptoms  Recent Flowsheet Documentation  Taken 8/7/2024 2122 by Klaudia Martinez RN  Safety Promotion/Fall Prevention:   activity supervised   nonskid shoes/slippers when out of bed   patient and family education   safety round/check completed   supervised activity  Taken 8/7/2024 1650 by Klaudia Martinez RN  Safety Promotion/Fall Prevention:   activity supervised   nonskid shoes/slippers when out of bed   patient and family education   safety round/check completed   supervised activity     Problem: Dysrhythmia  Goal: Normalized Cardiac Rhythm  Outcome: Progressing     Problem: Gas Exchange Impaired  Goal: Optimal Gas Exchange  Outcome: Progressing  Intervention: Optimize Oxygenation and Ventilation  Recent Flowsheet Documentation  Taken 8/7/2024 2122 by Klaudia Martinez RN  Head of Bed (HOB) Positioning: HOB at 30 degrees  Taken 8/7/2024 1650 by Klaudia Martinez RN  Head of Bed (HOB) Positioning: HOB at 30 degrees   Goal Outcome  Evaluation:                    Patient was an admit this afternoon from ED. Patient fell at home.    Alert. Oriented.     Denied pain. Denied shortness of breath. On room air.     He fell at home. Abrasion noted on his forehead, nose, and left knee.     HR in 50s, he went as low as mid 40s when sleeping. 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     House officer informed and updated re patient's orthostatic BP. Patient was asymptomatic.       08/07/24 1900   Lying Orthostatic BP   Lying Orthostatic /79   Lying Orthostatic Pulse 78 bpm   Sitting Orthostatic BP   Sitting Orthostatic /77   Sitting Orthostatic Pulse 69 bpm   Standing Orthostatic BP   Standing Orthostatic /80   Standing Orthostatic Pulse 80 bpm       No new order. Encouraged patient to drink fluids. Patient is not fond of drinking water. He likes to drink diet coke, coffee and tea. Diet [place] given.    Weak, unsteady. Standby to assist of 1. Gait belt, cane. Falls education, precaution and interventions placed.

## 2024-08-08 NOTE — PLAN OF CARE
"  Problem: Adult Inpatient Plan of Care  Goal: Plan of Care Review  Description: The Plan of Care Review/Shift note should be completed every shift.  The Outcome Evaluation is a brief statement about your assessment that the patient is improving, declining, or no change.  This information will be displayed automatically on your shift  note.  Outcome: Adequate for Care Transition  Goal: Patient-Specific Goal (Individualized)  Description: You can add care plan individualizations to a care plan. Examples of Individualization might be:  \"Parent requests to be called daily at 9am for status\", \"I have a hard time hearing out of my right ear\", or \"Do not touch me to wake me up as it startles  me\".  Outcome: Adequate for Care Transition  Goal: Absence of Hospital-Acquired Illness or Injury  Outcome: Adequate for Care Transition  Intervention: Identify and Manage Fall Risk  Recent Flowsheet Documentation  Taken 8/8/2024 0800 by Mini Harrison RN  Safety Promotion/Fall Prevention: activity supervised  Intervention: Prevent Infection  Recent Flowsheet Documentation  Taken 8/8/2024 0800 by Mini Harrison RN  Infection Prevention: hand hygiene promoted  Goal: Optimal Comfort and Wellbeing  Outcome: Adequate for Care Transition  Goal: Readiness for Transition of Care  Outcome: Adequate for Care Transition     Problem: Risk for Delirium  Goal: Optimal Coping  Outcome: Adequate for Care Transition  Goal: Improved Behavioral Control  Outcome: Adequate for Care Transition  Intervention: Minimize Safety Risk  Recent Flowsheet Documentation  Taken 8/8/2024 0800 by Mini Harrison RN  Communication Enhancement Strategies: call light answered in person  Goal: Improved Attention and Thought Clarity  Outcome: Adequate for Care Transition  Goal: Improved Sleep  Outcome: Adequate for Care Transition     Problem: Syncope  Goal: Absence of Syncopal Symptoms  Outcome: Adequate for Care Transition  Intervention: " Manage Effect of Syncopal Symptoms  Recent Flowsheet Documentation  Taken 8/8/2024 0800 by Mini Harrison RN  Safety Promotion/Fall Prevention: activity supervised     Problem: Dysrhythmia  Goal: Normalized Cardiac Rhythm  Outcome: Adequate for Care Transition     Problem: Gas Exchange Impaired  Goal: Optimal Gas Exchange  Outcome: Adequate for Care Transition   Goal Outcome Evaluation:       Patient alert and oriented x 4. Denied any syncope. Orthostatic BP good. Denied pain. Discharge education done. Taxi called to pick patient up and drive him home. Belongings packed. Call light in reach.

## 2024-08-10 NOTE — PROGRESS NOTES
[facility] Contact  [facility]/Voicemail     Clinical Data: Post-Discharge Outreach     Outreach attempted x 2.  Left message on patient's voicemail, providing [facility]'s central phone number of [phone] ([phone]) for questions/concerns and/or to schedule an appt with an [facility] provider, if they do not have a PCP.      Plan:  [facility] will do no further outreaches at this time.         KIAN Paredes  [phone]  [facility]

## 2025-06-18 NOTE — PLAN OF CARE
Goal Outcome Evaluation:       No acute events overnight. Vitals stable, denied any pain. Pt did not get OOB this shift, but no complaints of dizziness or lightheadedness at rest. Pt remains bradycardic in high 40's to low 50's when asleep.                  negative